# Patient Record
Sex: FEMALE | URBAN - METROPOLITAN AREA
[De-identification: names, ages, dates, MRNs, and addresses within clinical notes are randomized per-mention and may not be internally consistent; named-entity substitution may affect disease eponyms.]

---

## 2022-11-21 ENCOUNTER — TRANSCRIBE ORDER (OUTPATIENT)
Dept: SCHEDULING | Age: 67
End: 2022-11-21

## 2022-11-21 DIAGNOSIS — Z12.31 ENCOUNTER FOR SCREENING MAMMOGRAM FOR MALIGNANT NEOPLASM OF BREAST: Primary | ICD-10-CM

## 2022-11-22 ENCOUNTER — TRANSCRIBE ORDER (OUTPATIENT)
Dept: SCHEDULING | Age: 67
End: 2022-11-22

## 2022-11-22 DIAGNOSIS — Z12.31 VISIT FOR SCREENING MAMMOGRAM: Primary | ICD-10-CM

## 2023-01-03 ENCOUNTER — HOSPITAL ENCOUNTER (OUTPATIENT)
Dept: MAMMOGRAPHY | Age: 68
Discharge: HOME | End: 2023-01-03
Attending: OBSTETRICS & GYNECOLOGY
Payer: COMMERCIAL

## 2023-01-03 DIAGNOSIS — Z12.31 VISIT FOR SCREENING MAMMOGRAM: ICD-10-CM

## 2023-01-03 PROCEDURE — 77063 BREAST TOMOSYNTHESIS BI: CPT

## 2023-01-06 ENCOUNTER — OFFICE VISIT (OUTPATIENT)
Dept: FAMILY MEDICINE CLINIC | Age: 68
End: 2023-01-06
Payer: COMMERCIAL

## 2023-01-06 VITALS
TEMPERATURE: 97.9 F | BODY MASS INDEX: 23.35 KG/M2 | HEART RATE: 83 BPM | RESPIRATION RATE: 16 BRPM | OXYGEN SATURATION: 99 % | HEIGHT: 64 IN | SYSTOLIC BLOOD PRESSURE: 102 MMHG | WEIGHT: 136.8 LBS | DIASTOLIC BLOOD PRESSURE: 60 MMHG

## 2023-01-06 DIAGNOSIS — M85.852 OSTEOPENIA OF BOTH HIPS: Primary | ICD-10-CM

## 2023-01-06 DIAGNOSIS — R05.2 SUBACUTE COUGH: ICD-10-CM

## 2023-01-06 DIAGNOSIS — M85.851 OSTEOPENIA OF BOTH HIPS: Primary | ICD-10-CM

## 2023-01-06 PROCEDURE — 1123F ACP DISCUSS/DSCN MKR DOCD: CPT | Performed by: FAMILY MEDICINE

## 2023-01-06 PROCEDURE — 99203 OFFICE O/P NEW LOW 30 MIN: CPT | Performed by: FAMILY MEDICINE

## 2023-01-06 RX ORDER — ESTRADIOL 10 UG/1
INSERT VAGINAL
COMMUNITY

## 2023-01-06 RX ORDER — DESONIDE 0.5 MG/G
OINTMENT TOPICAL
COMMUNITY
Start: 2022-10-24

## 2023-01-06 NOTE — PROGRESS NOTES
Identified pt with two pt identifiers(name and ). Chief Complaint   Patient presents with    Cough     Would like to discuss with you about her cough         Health Maintenance Due   Topic    Hepatitis C Screening     Depression Screen     DTaP/Tdap/Td series (1 - Tdap)    Lipid Screen     Colorectal Cancer Screening Combo     Shingles Vaccine (1 of 2)    Bone Densitometry (Dexa) Screening     Pneumococcal 65+ years (1 - PCV)       Wt Readings from Last 3 Encounters:   23 136 lb 12.8 oz (62.1 kg)     Temp Readings from Last 3 Encounters:   23 97.9 °F (36.6 °C) (Temporal)     BP Readings from Last 3 Encounters:   23 102/60     Pulse Readings from Last 3 Encounters:   23 83         Learning Assessment:  :     No flowsheet data found. Depression Screening:  :     3 most recent PHQ Screens 2023   Little interest or pleasure in doing things Not at all   Feeling down, depressed, irritable, or hopeless Not at all   Total Score PHQ 2 0       Fall Risk Assessment:  :     Fall Risk Assessment, last 12 mths 2023   Able to walk? Yes   Fall in past 12 months? 0   Do you feel unsteady? 0   Are you worried about falling 0       Abuse Screening:  :     Abuse Screening Questionnaire 2023   Do you ever feel afraid of your partner? N   Are you in a relationship with someone who physically or mentally threatens you? N   Is it safe for you to go home? Y       Coordination of Care Questionnaire:  :     1) Have you been to an emergency room, urgent care clinic since your last visit? no   Hospitalized since your last visit? no             2) Have you seen or consulted any other health care providers outside of [address] [place] [address] since your last visit? no (Include any pap smears or colon screenings in this section.)    3) Do you have an Advance Directive on file?  no  Are you interested in receiving information about Advance Directives? no    Patient is accompanied by self I have received verbal consent from Dorina Montiel to discuss any/all medical information while they are present in the room. 4.  For patients aged 39-70: Has the patient had a colonoscopy / FIT/ Cologuard? Yes - no Care Gap present June 2022 Dr Blanca Mcintosh MD       If the patient is female:    11. For patients aged 41-77: Has the patient had a mammogram within the past 2 years? Yes - no Care Gap present      6. For patients aged 21-65: Has the patient had a pap smear?  Yes - no Care Gap present last month Dr. Orozco Graft

## 2023-01-10 NOTE — PROGRESS NOTES
Robert Carlos (: 1955) is a 79 y.o. female, new patient, here for evaluation of the following chief complaint(s):  Cough (Would like to discuss with you about her cough )       ASSESSMENT/PLAN:  Below is the assessment and plan developed based on review of pertinent history, physical exam, labs, studies, and medications. 1. Osteopenia of both hips  -     REFERRAL TO DERMATOLOGY  -     REFERRAL TO GASTROENTEROLOGY  2. Subacute cough      No follow-ups on file. SUBJECTIVE/OBJECTIVE:  Cough  Patient complains of nonproductive cough. Symptoms began several months ago. The cough is non-productive, without wheezing, dyspnea or hemoptysis and is aggravated by pollens and cold air Associated symptoms include:SOB. Patient does not have new pets. Patient does not have a history of asthma. Patient does not have a history of environmental allergens. Patient does not have recent travel. Patient does not have a history of smoking. Patient  has previous Chest X-ray. Patient has had a PPD done. Patient reports a full work up for cough- was worked up for interstitial lung disease? Listed non specific symptoms. Also states that cough has lingered after a viral illness      Review of Systems   Constitutional: Negative. HENT: Negative. Respiratory:  Negative for cough, chest tightness, shortness of breath and wheezing. All other systems reviewed and are negative. Physical Exam  Constitutional:       Appearance: Normal appearance. She is normal weight. HENT:      Head: Normocephalic and atraumatic. Right Ear: Tympanic membrane, ear canal and external ear normal.      Left Ear: Tympanic membrane, ear canal and external ear normal.      Nose: Nose normal.      Mouth/Throat:      Mouth: Mucous membranes are moist.      Pharynx: Oropharynx is clear. Eyes:      Extraocular Movements: Extraocular movements intact.       Conjunctiva/sclera: Conjunctivae normal.      Pupils: Pupils are equal, round, and reactive to light. Cardiovascular:      Rate and Rhythm: Normal rate and regular rhythm. Pulses: Normal pulses. Heart sounds: Normal heart sounds. Pulmonary:      Effort: Pulmonary effort is normal.      Breath sounds: Normal breath sounds. Abdominal:      General: Abdomen is flat. Bowel sounds are normal.   Musculoskeletal:         General: Normal range of motion. Cervical back: Normal range of motion and neck supple. Skin:     General: Skin is warm. Capillary Refill: Capillary refill takes less than 2 seconds. Neurological:      General: No focal deficit present. Mental Status: She is alert and oriented to person, place, and time. Mental status is at baseline. Psychiatric:         Mood and Affect: Mood normal.         Behavior: Behavior normal.         Thought Content: Thought content normal.         Judgment: Judgment normal.       An electronic signature was used to authenticate this note.   -- Saba Boland MD

## 2023-02-01 ENCOUNTER — OFFICE VISIT (OUTPATIENT)
Dept: FAMILY MEDICINE CLINIC | Age: 68
End: 2023-02-01
Payer: COMMERCIAL

## 2023-02-01 VITALS
DIASTOLIC BLOOD PRESSURE: 64 MMHG | WEIGHT: 139 LBS | SYSTOLIC BLOOD PRESSURE: 110 MMHG | RESPIRATION RATE: 17 BRPM | HEIGHT: 64 IN | OXYGEN SATURATION: 98 % | TEMPERATURE: 97.9 F | BODY MASS INDEX: 23.73 KG/M2 | HEART RATE: 82 BPM

## 2023-02-01 DIAGNOSIS — K90.0 CELIAC DISEASE: Primary | ICD-10-CM

## 2023-02-01 DIAGNOSIS — Z00.00 ENCOUNTER FOR SCREENING AND PREVENTATIVE CARE: ICD-10-CM

## 2023-02-01 DIAGNOSIS — R93.1 AGATSTON CORONARY ARTERY CALCIUM SCORE LESS THAN 100: ICD-10-CM

## 2023-02-01 PROCEDURE — 99213 OFFICE O/P EST LOW 20 MIN: CPT | Performed by: FAMILY MEDICINE

## 2023-02-01 PROCEDURE — 1123F ACP DISCUSS/DSCN MKR DOCD: CPT | Performed by: FAMILY MEDICINE

## 2023-02-01 NOTE — PROGRESS NOTES
Identified pt with two pt identifiers(name and ). Chief Complaint   Patient presents with    Physical     Patient is here for yearly physical     Labs     Patient needs to have lab work done         Health Maintenance Due   Topic    Hepatitis C Screening     DTaP/Tdap/Td series (1 - Tdap)    Lipid Screen     Colorectal Cancer Screening Combo     Shingles Vaccine (1 of 2)    Bone Densitometry (Dexa) Screening     Pneumococcal 65+ years (1 - PCV)       Wt Readings from Last 3 Encounters:   23 136 lb 12.8 oz (62.1 kg)     Temp Readings from Last 3 Encounters:   23 97.9 °F (36.6 °C) (Temporal)     BP Readings from Last 3 Encounters:   23 102/60     Pulse Readings from Last 3 Encounters:   23 83         Learning Assessment:  :     No flowsheet data found. Depression Screening:  :     3 most recent PHQ Screens 2023   Little interest or pleasure in doing things Not at all   Feeling down, depressed, irritable, or hopeless Not at all   Total Score PHQ 2 0       Fall Risk Assessment:  :     Fall Risk Assessment, last 12 mths 2023   Able to walk? Yes   Fall in past 12 months? 0   Do you feel unsteady? 0   Are you worried about falling 0       Abuse Screening:  :     Abuse Screening Questionnaire 2023   Do you ever feel afraid of your partner? N N   Are you in a relationship with someone who physically or mentally threatens you? N N   Is it safe for you to go home? Y Y       Coordination of Care Questionnaire:  :     1) Have you been to an emergency room, urgent care clinic since your last visit? no   Hospitalized since your last visit? no             2) Have you seen or consulted any other health care providers outside of [address] [place] [address] since your last visit? no  (Include any pap smears or colon screenings in this section.)    3) Do you have an Advance Directive on file?  no  Are you interested in receiving information about Advance Directives? no    Patient is accompanied by self I have received verbal consent from [facility] to discuss any/all medical information while they are present in the room. 4.  For patients aged 39-70: Has the patient had a colonoscopy / FIT/ Cologuard? No      If the patient is female:    5. For patients aged 41-77: Has the patient had a mammogram within the past 2 years? Yes - no Care Gap present      6. For patients aged 21-65: Has the patient had a pap smear?  NA - based on age or sex

## 2023-02-06 NOTE — PROGRESS NOTES
Remington Ospina (: 1955) is a 79 y.o. female, new patient, here for evaluation of the following chief complaint(s):  Physical (Patient is here for yearly physical ) and Labs (Patient needs to have lab work done )       ASSESSMENT/PLAN:  Below is the assessment and plan developed based on review of pertinent history, physical exam, labs, studies, and medications. 1. Celiac disease  -     CBC WITH AUTOMATED DIFF; Future  -     METABOLIC PANEL, COMPREHENSIVE; Future  -     HEMOGLOBIN A1C WITH EAG; Future  -     CELIAC DISEASE PROFILE (REFLEX TTG, IGG); Future  -     OCCULT BLOOD IMMUNOASSAY,DIAGNOSTIC; Future  2. Agatston coronary artery calcium score less than 100  -     TSH 3RD GENERATION; Future  -     T4 (THYROXINE); Future  -     LIPID PANEL; Future  3. Encounter for screening and preventative care  -     CBC WITH AUTOMATED DIFF; Future  -     METABOLIC PANEL, COMPREHENSIVE; Future      No follow-ups on file. SUBJECTIVE/OBJECTIVE:  Patient presents to discuss concerns with cardiovascular health. Has been healthy, has a strong family history of CAD and HTN. Review of Systems   Constitutional: Negative. HENT: Negative. Respiratory:  Negative for cough, chest tightness, shortness of breath and wheezing. All other systems reviewed and are negative. Physical Exam  Constitutional:       Appearance: Normal appearance. She is normal weight. HENT:      Head: Normocephalic and atraumatic. Right Ear: Tympanic membrane, ear canal and external ear normal.      Left Ear: Tympanic membrane, ear canal and external ear normal.      Nose: Nose normal.      Mouth/Throat:      Mouth: Mucous membranes are moist.      Pharynx: Oropharynx is clear. Eyes:      Extraocular Movements: Extraocular movements intact. Conjunctiva/sclera: Conjunctivae normal.      Pupils: Pupils are equal, round, and reactive to light. Cardiovascular:      Rate and Rhythm: Normal rate and regular rhythm. Pulses: Normal pulses. Heart sounds: Normal heart sounds. Pulmonary:      Effort: Pulmonary effort is normal.      Breath sounds: Normal breath sounds. Abdominal:      General: Abdomen is flat. Bowel sounds are normal.   Musculoskeletal:         General: Normal range of motion. Cervical back: Normal range of motion and neck supple. Skin:     General: Skin is warm. Capillary Refill: Capillary refill takes less than 2 seconds. Neurological:      General: No focal deficit present. Mental Status: She is alert and oriented to person, place, and time. Mental status is at baseline. Psychiatric:         Mood and Affect: Mood normal.         Behavior: Behavior normal.         Thought Content: Thought content normal.         Judgment: Judgment normal.       An electronic signature was used to authenticate this note.   -- Tonny Goldmann, MD

## 2023-02-07 ENCOUNTER — APPOINTMENT (OUTPATIENT)
Dept: FAMILY MEDICINE CLINIC | Age: 68
End: 2023-02-07

## 2023-02-07 DIAGNOSIS — R93.1 AGATSTON CORONARY ARTERY CALCIUM SCORE LESS THAN 100: ICD-10-CM

## 2023-02-07 DIAGNOSIS — Z00.00 ENCOUNTER FOR SCREENING AND PREVENTATIVE CARE: ICD-10-CM

## 2023-02-07 DIAGNOSIS — K90.0 CELIAC DISEASE: ICD-10-CM

## 2023-02-08 LAB
ALBUMIN SERPL-MCNC: 4.2 G/DL (ref 3.5–5)
ALBUMIN/GLOB SERPL: 1.6 (ref 1.1–2.2)
ALP SERPL-CCNC: 76 U/L (ref 45–117)
ALT SERPL-CCNC: 41 U/L (ref 12–78)
ANION GAP SERPL CALC-SCNC: 5 MMOL/L (ref 5–15)
AST SERPL-CCNC: 26 U/L (ref 15–37)
BASOPHILS # BLD: 0.1 K/UL (ref 0–0.1)
BASOPHILS NFR BLD: 1 % (ref 0–1)
BILIRUB SERPL-MCNC: 0.7 MG/DL (ref 0.2–1)
BUN SERPL-MCNC: 19 MG/DL (ref 6–20)
BUN/CREAT SERPL: 24 (ref 12–20)
CALCIUM SERPL-MCNC: 9.6 MG/DL (ref 8.5–10.1)
CHLORIDE SERPL-SCNC: 105 MMOL/L (ref 97–108)
CHOLEST SERPL-MCNC: 249 MG/DL
CO2 SERPL-SCNC: 29 MMOL/L (ref 21–32)
CREAT SERPL-MCNC: 0.79 MG/DL (ref 0.55–1.02)
DIFFERENTIAL METHOD BLD: ABNORMAL
EOSINOPHIL # BLD: 0.1 K/UL (ref 0–0.4)
EOSINOPHIL NFR BLD: 2 % (ref 0–7)
ERYTHROCYTE [DISTWIDTH] IN BLOOD BY AUTOMATED COUNT: 12.7 % (ref 11.5–14.5)
EST. AVERAGE GLUCOSE BLD GHB EST-MCNC: 105 MG/DL
GLOBULIN SER CALC-MCNC: 2.7 G/DL (ref 2–4)
GLUCOSE SERPL-MCNC: 92 MG/DL (ref 65–100)
HBA1C MFR BLD: 5.3 % (ref 4–5.6)
HCT VFR BLD AUTO: 42.7 % (ref 35–47)
HDLC SERPL-MCNC: 78 MG/DL
HDLC SERPL: 3.2 (ref 0–5)
HGB BLD-MCNC: 14.2 G/DL (ref 11.5–16)
IMM GRANULOCYTES # BLD AUTO: 0 K/UL (ref 0–0.04)
IMM GRANULOCYTES NFR BLD AUTO: 0 % (ref 0–0.5)
LDLC SERPL CALC-MCNC: 161 MG/DL (ref 0–100)
LYMPHOCYTES # BLD: 1.4 K/UL (ref 0.8–3.5)
LYMPHOCYTES NFR BLD: 33 % (ref 12–49)
MCH RBC QN AUTO: 33.4 PG (ref 26–34)
MCHC RBC AUTO-ENTMCNC: 33.3 G/DL (ref 30–36.5)
MCV RBC AUTO: 100.5 FL (ref 80–99)
MONOCYTES # BLD: 0.4 K/UL (ref 0–1)
MONOCYTES NFR BLD: 10 % (ref 5–13)
NEUTS SEG # BLD: 2.2 K/UL (ref 1.8–8)
NEUTS SEG NFR BLD: 54 % (ref 32–75)
NRBC # BLD: 0 K/UL (ref 0–0.01)
NRBC BLD-RTO: 0 PER 100 WBC
PLATELET # BLD AUTO: 184 K/UL (ref 150–400)
PMV BLD AUTO: 12.1 FL (ref 8.9–12.9)
POTASSIUM SERPL-SCNC: 4.6 MMOL/L (ref 3.5–5.1)
PROT SERPL-MCNC: 6.9 G/DL (ref 6.4–8.2)
RBC # BLD AUTO: 4.25 M/UL (ref 3.8–5.2)
SODIUM SERPL-SCNC: 139 MMOL/L (ref 136–145)
T4 SERPL-MCNC: 6.8 UG/DL (ref 4.8–13.9)
TRIGL SERPL-MCNC: 50 MG/DL (ref ?–150)
TSH SERPL DL<=0.05 MIU/L-ACNC: 1.07 UIU/ML (ref 0.36–3.74)
VLDLC SERPL CALC-MCNC: 10 MG/DL
WBC # BLD AUTO: 4.2 K/UL (ref 3.6–11)

## 2023-02-10 LAB
ENDOMYSIUM IGA SER QL: NEGATIVE
IGA SERPL-MCNC: 228 MG/DL (ref 87–352)
TTG IGA SER-ACNC: <2 U/ML (ref 0–3)

## 2023-04-22 DIAGNOSIS — Z12.31 ENCOUNTER FOR SCREENING MAMMOGRAM FOR MALIGNANT NEOPLASM OF BREAST: Primary | ICD-10-CM

## 2023-08-02 ENCOUNTER — TELEPHONE (OUTPATIENT)
Age: 68
End: 2023-08-02

## 2023-08-02 DIAGNOSIS — E78.00 HYPERCHOLESTEROLEMIA: Primary | ICD-10-CM

## 2023-08-02 NOTE — TELEPHONE ENCOUNTER
Pt called asking to make a lab appointment but there was no orders for labs. I told her this and she said that she was told for her appointment on Thursday 8/10 that you guys would be going over her lipid and cholesterol labs so she wanted to do those labs before that appointment. I told her there wasn't a lab order so I couldn't make a lab appointment and that I would send a message back.

## 2023-08-02 NOTE — TELEPHONE ENCOUNTER
----- Message from Maribell Plummer sent at 8/2/2023  1:28 PM EDT -----  Subject: Referral Request    Reason for referral request? Patient said she needs orders for lipids labs   completed for her upcoming appt on 08/10  Provider patient wants to be referred to(if known):     Provider Phone Number(if known):     Additional Information for Provider?   ---------------------------------------------------------------------------  --------------  Jacqueline Bristow Kwaku    [phone]; OK to leave message on voicemail  ---------------------------------------------------------------------------  --------------

## 2023-08-07 SDOH — ECONOMIC STABILITY: TRANSPORTATION INSECURITY
IN THE PAST 12 MONTHS, HAS LACK OF TRANSPORTATION KEPT YOU FROM MEETINGS, WORK, OR FROM GETTING THINGS NEEDED FOR DAILY LIVING?: NO

## 2023-08-07 SDOH — ECONOMIC STABILITY: FOOD INSECURITY: WITHIN THE PAST 12 MONTHS, THE FOOD YOU BOUGHT JUST DIDN'T LAST AND YOU DIDN'T HAVE MONEY TO GET MORE.: NEVER TRUE

## 2023-08-07 SDOH — ECONOMIC STABILITY: HOUSING INSECURITY
IN THE LAST 12 MONTHS, WAS THERE A TIME WHEN YOU DID NOT HAVE A STEADY PLACE TO SLEEP OR SLEPT IN A SHELTER (INCLUDING NOW)?: NO

## 2023-08-07 SDOH — ECONOMIC STABILITY: FOOD INSECURITY: WITHIN THE PAST 12 MONTHS, YOU WORRIED THAT YOUR FOOD WOULD RUN OUT BEFORE YOU GOT MONEY TO BUY MORE.: NEVER TRUE

## 2023-08-07 SDOH — ECONOMIC STABILITY: INCOME INSECURITY: HOW HARD IS IT FOR YOU TO PAY FOR THE VERY BASICS LIKE FOOD, HOUSING, MEDICAL CARE, AND HEATING?: NOT HARD AT ALL

## 2023-08-10 ENCOUNTER — OFFICE VISIT (OUTPATIENT)
Age: 68
End: 2023-08-10
Payer: COMMERCIAL

## 2023-08-10 VITALS
HEIGHT: 64 IN | HEART RATE: 69 BPM | RESPIRATION RATE: 16 BRPM | TEMPERATURE: 97.5 F | DIASTOLIC BLOOD PRESSURE: 62 MMHG | BODY MASS INDEX: 23.39 KG/M2 | SYSTOLIC BLOOD PRESSURE: 112 MMHG | WEIGHT: 137 LBS | OXYGEN SATURATION: 97 %

## 2023-08-10 DIAGNOSIS — E78.2 MODERATE MIXED HYPERLIPIDEMIA NOT REQUIRING STATIN THERAPY: Primary | ICD-10-CM

## 2023-08-10 PROCEDURE — G8420 CALC BMI NORM PARAMETERS: HCPCS | Performed by: FAMILY MEDICINE

## 2023-08-10 PROCEDURE — G8400 PT W/DXA NO RESULTS DOC: HCPCS | Performed by: FAMILY MEDICINE

## 2023-08-10 PROCEDURE — 1036F TOBACCO NON-USER: CPT | Performed by: FAMILY MEDICINE

## 2023-08-10 PROCEDURE — 1090F PRES/ABSN URINE INCON ASSESS: CPT | Performed by: FAMILY MEDICINE

## 2023-08-10 PROCEDURE — 3017F COLORECTAL CA SCREEN DOC REV: CPT | Performed by: FAMILY MEDICINE

## 2023-08-10 PROCEDURE — G8427 DOCREV CUR MEDS BY ELIG CLIN: HCPCS | Performed by: FAMILY MEDICINE

## 2023-08-10 PROCEDURE — 99213 OFFICE O/P EST LOW 20 MIN: CPT | Performed by: FAMILY MEDICINE

## 2023-08-10 PROCEDURE — 1123F ACP DISCUSS/DSCN MKR DOCD: CPT | Performed by: FAMILY MEDICINE

## 2023-08-10 ASSESSMENT — PATIENT HEALTH QUESTIONNAIRE - PHQ9
SUM OF ALL RESPONSES TO PHQ QUESTIONS 1-9: 0
SUM OF ALL RESPONSES TO PHQ9 QUESTIONS 1 & 2: 0
SUM OF ALL RESPONSES TO PHQ QUESTIONS 1-9: 0
SUM OF ALL RESPONSES TO PHQ QUESTIONS 1-9: 0
1. LITTLE INTEREST OR PLEASURE IN DOING THINGS: 0
SUM OF ALL RESPONSES TO PHQ QUESTIONS 1-9: 0
2. FEELING DOWN, DEPRESSED OR HOPELESS: 0

## 2023-08-11 ASSESSMENT — ENCOUNTER SYMPTOMS
EYES NEGATIVE: 1
ALLERGIC/IMMUNOLOGIC NEGATIVE: 1
VOMITING: 0
EYE DISCHARGE: 0
CONSTIPATION: 0
EYE PAIN: 0
NAUSEA: 0
DIARRHEA: 0
ABDOMINAL PAIN: 0
SINUS PRESSURE: 0
RHINORRHEA: 0
EYE REDNESS: 0
ANAL BLEEDING: 0
COLOR CHANGE: 0
RESPIRATORY NEGATIVE: 1
BLOOD IN STOOL: 0
GASTROINTESTINAL NEGATIVE: 1

## 2023-08-11 NOTE — PROGRESS NOTES
Carlos Jenkins (:  1955) is a 79 y.o. female,Established patient, here for evaluation of the following chief complaint(s):  Follow-up (6 month follow up)         ASSESSMENT/PLAN:  1. Moderate mixed hyperlipidemia not requiring statin therapy      No follow-ups on file. Subjective   SUBJECTIVE/OBJECTIVE:  Patient presents as a follow up for hyperlipidemia. Patient has been doing well without complaints, has started taking fiber supplements and has increased her total fiber intake. She is drinking more water and exercising without any complaints. She states that her blood pressure has been well controlled. She has followed up with her dermatologist, is up to date on all of her preventative medicine needs. Review of Systems   Constitutional: Negative. Negative for activity change, chills, fatigue and unexpected weight change. HENT: Negative. Negative for congestion, dental problem, nosebleeds, rhinorrhea and sinus pressure. Eyes: Negative. Negative for pain, discharge, redness and visual disturbance. Respiratory: Negative. Cardiovascular: Negative. Gastrointestinal: Negative. Negative for abdominal pain, anal bleeding, blood in stool, constipation, diarrhea, nausea and vomiting. Endocrine: Negative. Negative for cold intolerance, polydipsia and polyuria. Genitourinary: Negative. Negative for decreased urine volume, dyspareunia, dysuria, flank pain, frequency, hematuria, pelvic pain and urgency. Musculoskeletal:  Negative for arthralgias, gait problem, joint swelling and neck stiffness. Skin:  Negative for color change, pallor, rash and wound. Allergic/Immunologic: Negative. Negative for environmental allergies, food allergies and immunocompromised state. Neurological: Negative. Negative for dizziness, speech difficulty, weakness, light-headedness and headaches. Hematological: Negative. Psychiatric/Behavioral: Negative.   Negative for behavioral problems,

## 2023-08-15 ENCOUNTER — NURSE ONLY (OUTPATIENT)
Age: 68
End: 2023-08-15

## 2023-08-15 DIAGNOSIS — E78.00 HYPERCHOLESTEROLEMIA: ICD-10-CM

## 2023-08-16 LAB
CHOLEST SERPL-MCNC: 235 MG/DL
HDLC SERPL-MCNC: 68 MG/DL
HDLC SERPL: 3.5 (ref 0–5)
LDLC SERPL CALC-MCNC: 153.2 MG/DL (ref 0–100)
TRIGL SERPL-MCNC: 69 MG/DL
VLDLC SERPL CALC-MCNC: 13.8 MG/DL

## 2023-12-06 ENCOUNTER — TRANSCRIBE ORDERS (OUTPATIENT)
Facility: HOSPITAL | Age: 68
End: 2023-12-06

## 2023-12-06 DIAGNOSIS — Z12.31 VISIT FOR SCREENING MAMMOGRAM: Primary | ICD-10-CM

## 2024-01-04 ENCOUNTER — HOSPITAL ENCOUNTER (OUTPATIENT)
Facility: HOSPITAL | Age: 69
Discharge: HOME | End: 2024-01-04
Attending: OBSTETRICS & GYNECOLOGY
Payer: MEDICARE

## 2024-01-04 VITALS — BODY MASS INDEX: 22.36 KG/M2 | HEIGHT: 64 IN | WEIGHT: 131 LBS

## 2024-01-04 DIAGNOSIS — Z12.31 VISIT FOR SCREENING MAMMOGRAM: ICD-10-CM

## 2024-01-04 PROCEDURE — 77067 SCR MAMMO BI INCL CAD: CPT

## 2024-02-15 ENCOUNTER — OFFICE VISIT (OUTPATIENT)
Age: 69
End: 2024-02-15
Payer: MEDICARE

## 2024-02-15 VITALS
DIASTOLIC BLOOD PRESSURE: 67 MMHG | RESPIRATION RATE: 17 BRPM | OXYGEN SATURATION: 98 % | HEIGHT: 64 IN | WEIGHT: 128.4 LBS | HEART RATE: 80 BPM | SYSTOLIC BLOOD PRESSURE: 128 MMHG | TEMPERATURE: 97.6 F | BODY MASS INDEX: 21.92 KG/M2

## 2024-02-15 DIAGNOSIS — K90.0 CELIAC DISEASE: ICD-10-CM

## 2024-02-15 DIAGNOSIS — R79.9 ABNORMAL FINDING OF BLOOD CHEMISTRY, UNSPECIFIED: ICD-10-CM

## 2024-02-15 DIAGNOSIS — Z00.00 MEDICARE ANNUAL WELLNESS VISIT, SUBSEQUENT: Primary | ICD-10-CM

## 2024-02-15 DIAGNOSIS — M48.061 SPINAL STENOSIS OF LUMBAR REGION WITHOUT NEUROGENIC CLAUDICATION: ICD-10-CM

## 2024-02-15 DIAGNOSIS — Z13.820 SCREENING FOR OSTEOPOROSIS: ICD-10-CM

## 2024-02-15 DIAGNOSIS — Z09 ENCOUNTER FOR FOLLOW-UP EXAMINATION AFTER COMPLETED TREATMENT FOR CONDITIONS OTHER THAN MALIGNANT NEOPLASM: ICD-10-CM

## 2024-02-15 DIAGNOSIS — E78.00 PURE HYPERCHOLESTEROLEMIA: ICD-10-CM

## 2024-02-15 PROCEDURE — G0439 PPPS, SUBSEQ VISIT: HCPCS | Performed by: FAMILY MEDICINE

## 2024-02-15 PROCEDURE — G8484 FLU IMMUNIZE NO ADMIN: HCPCS | Performed by: FAMILY MEDICINE

## 2024-02-15 PROCEDURE — 1123F ACP DISCUSS/DSCN MKR DOCD: CPT | Performed by: FAMILY MEDICINE

## 2024-02-15 PROCEDURE — 3017F COLORECTAL CA SCREEN DOC REV: CPT | Performed by: FAMILY MEDICINE

## 2024-02-15 RX ORDER — CETIRIZINE HYDROCHLORIDE 10 MG/1
10 TABLET ORAL DAILY
COMMUNITY

## 2024-02-15 NOTE — PROGRESS NOTES
Identified pt with two pt identifiers(name and ).    Chief Complaint   Patient presents with    Medicare AWV        Health Maintenance Due   Topic    Hepatitis C screen     DTaP/Tdap/Td vaccine (1 - Tdap)    Shingles vaccine (1 of 2)    DEXA (modify frequency per FRAX score)     Respiratory Syncytial Virus (RSV) Pregnant or age 60 yrs+ (1 - 1-dose 60+ series)    Pneumococcal 65+ years Vaccine (1 - PCV)    Annual Wellness Visit (Medicare)        Wt Readings from Last 3 Encounters:   24 59.4 kg (131 lb)   08/10/23 62.1 kg (137 lb)   23 63 kg (139 lb)     Temp Readings from Last 3 Encounters:   08/10/23 97.5 °F (36.4 °C) (Temporal)     BP Readings from Last 3 Encounters:   08/10/23 112/62   23 110/64   23 102/60     Pulse Readings from Last 3 Encounters:   08/10/23 69   23 82   23 83           Depression Screening:  :         2024     2:00 PM 8/10/2023     2:37 PM 2023     3:22 PM 2023     7:46 AM 2023     2:00 PM   PHQ-9 Questionaire   Little interest or pleasure in doing things 0 0 0 0 0   Feeling down, depressed, or hopeless 0 0 0 0 0   PHQ-9 Total Score 0 0 0 0 0        Fall Risk Assessment:  :         2024     2:00 PM 8/10/2023     2:38 PM   Fall Risk   2 or more falls in past year? no no   Fall with injury in past year? no no        Abuse Screening:  :          No data to display                 Coordination of Care Questionnaire:  :     \"Have you been to the ER, urgent care clinic since your last visit?  Hospitalized since your last visit?\"    NO    “Have you seen or consulted any other health care providers outside of [facility] since your last visit?”    NO

## 2024-02-17 NOTE — PROGRESS NOTES
Medicare Annual Wellness Visit    Terri Corbett is here for Medicare AW    Assessment & Plan   Screening for osteoporosis  -     DEXA BONE DENSITY AXIAL SKELETON; Future  -     [facility] - Mark Puri MD, Orthopedic Surgery (back, neck, spine), [place]  -     [facility] - Carter Hartman MD, Physical Medicine & Rehab, [place]  -     Vitamin D 25 Hydroxy; Future  Spinal stenosis of lumbar region without neurogenic claudication  -     DEXA BONE DENSITY AXIAL SKELETON; Future  -     [facility] - Mark Puri MD, Orthopedic Surgery (back, neck, spine), [place]  -     Carter Blanco MD, Physical Medicine & Rehab, [place]  Encounter for follow-up examination after completed treatment for conditions other than malignant neoplasm  -     DEXA BONE DENSITY AXIAL SKELETON; Future  Celiac disease  -     Lipid Panel; Future  -     Vitamin D 25 Hydroxy; Future  Pure hypercholesterolemia  -     TSH + Free T4 Panel; Future  -     Comprehensive Metabolic Panel; Future  -     Hemoglobin A1C; Future  Abnormal finding of blood chemistry, unspecified  -     Lipid Panel; Future  -     Hemoglobin A1C; Future  Medicare annual wellness visit, subsequent    Recommendations for Preventive Services Due: see orders and patient instructions/AVS.  Recommended screening schedule for the next 5-10 years is provided to the patient in written form: see Patient Instructions/AVS.     No follow-ups on file.     Subjective   The following acute and/or chronic problems were also addressed today:    Patient complains of back pain, lower. States that it is the worse when she sits for prolonged periods or does any activity. She takes care of her grandchild. Is very physically active, but has had to stop running, continues to swim.     She takes the appropriate dose of calcium, vitamin D, and has started taking collagen.     Patient's complete Health Risk Assessment and screening values have been reviewed and are found in Flowsheets. The

## 2024-02-20 ENCOUNTER — NURSE ONLY (OUTPATIENT)
Age: 69
End: 2024-02-20

## 2024-02-20 DIAGNOSIS — E78.00 PURE HYPERCHOLESTEROLEMIA: ICD-10-CM

## 2024-02-20 DIAGNOSIS — Z13.820 SCREENING FOR OSTEOPOROSIS: ICD-10-CM

## 2024-02-20 DIAGNOSIS — R79.9 ABNORMAL FINDING OF BLOOD CHEMISTRY, UNSPECIFIED: ICD-10-CM

## 2024-02-20 DIAGNOSIS — K90.0 CELIAC DISEASE: ICD-10-CM

## 2024-02-20 LAB — 25(OH)D3 SERPL-MCNC: 48.8 NG/ML (ref 30–100)

## 2024-02-21 ENCOUNTER — TRANSCRIBE ORDERS (OUTPATIENT)
Facility: HOSPITAL | Age: 69
End: 2024-02-21

## 2024-02-21 DIAGNOSIS — Z13.6 SCREENING, HEART DISEASE, ISCHEMIC: Primary | ICD-10-CM

## 2024-02-21 LAB
ALBUMIN SERPL-MCNC: 3.9 G/DL (ref 3.5–5)
ALBUMIN/GLOB SERPL: 1.1 (ref 1.1–2.2)
ALP SERPL-CCNC: 97 U/L (ref 45–117)
ALT SERPL-CCNC: 31 U/L (ref 12–78)
ANION GAP SERPL CALC-SCNC: 1 MMOL/L (ref 5–15)
AST SERPL-CCNC: 26 U/L (ref 15–37)
BILIRUB SERPL-MCNC: 0.5 MG/DL (ref 0.2–1)
BUN SERPL-MCNC: 18 MG/DL (ref 6–20)
BUN/CREAT SERPL: 25 (ref 12–20)
CALCIUM SERPL-MCNC: 9.6 MG/DL (ref 8.5–10.1)
CHLORIDE SERPL-SCNC: 105 MMOL/L (ref 97–108)
CHOLEST SERPL-MCNC: 217 MG/DL
CO2 SERPL-SCNC: 31 MMOL/L (ref 21–32)
CREAT SERPL-MCNC: 0.72 MG/DL (ref 0.55–1.02)
EST. AVERAGE GLUCOSE BLD GHB EST-MCNC: 105 MG/DL
GLOBULIN SER CALC-MCNC: 3.4 G/DL (ref 2–4)
GLUCOSE SERPL-MCNC: 94 MG/DL (ref 65–100)
HBA1C MFR BLD: 5.3 % (ref 4–5.6)
HDLC SERPL-MCNC: 55 MG/DL
HDLC SERPL: 3.9 (ref 0–5)
LDLC SERPL CALC-MCNC: 151 MG/DL (ref 0–100)
POTASSIUM SERPL-SCNC: 4.3 MMOL/L (ref 3.5–5.1)
PROT SERPL-MCNC: 7.3 G/DL (ref 6.4–8.2)
SODIUM SERPL-SCNC: 137 MMOL/L (ref 136–145)
T4 FREE SERPL-MCNC: 0.9 NG/DL (ref 0.8–1.5)
TRIGL SERPL-MCNC: 55 MG/DL
TSH SERPL DL<=0.05 MIU/L-ACNC: 1.4 UIU/ML (ref 0.36–3.74)
VLDLC SERPL CALC-MCNC: 11 MG/DL

## 2024-02-28 ENCOUNTER — TELEPHONE (OUTPATIENT)
Age: 69
End: 2024-02-28

## 2024-02-28 NOTE — TELEPHONE ENCOUNTER
----- Message from Terri Corbett sent at 2/27/2024  9:48 PM EST -----  Regarding: Colonoscopy Request  Contact: [phone]  [facility]  Dr Sanders   [phone]

## 2024-03-03 ENCOUNTER — HOSPITAL ENCOUNTER (OUTPATIENT)
Facility: HOSPITAL | Age: 69
Discharge: HOME | End: 2024-03-06
Attending: FAMILY MEDICINE

## 2024-03-03 DIAGNOSIS — Z13.6 SCREENING, HEART DISEASE, ISCHEMIC: ICD-10-CM

## 2024-03-03 PROCEDURE — 75571 CT HRT W/O DYE W/CA TEST: CPT

## 2024-03-06 ENCOUNTER — HOSPITAL ENCOUNTER (OUTPATIENT)
Facility: HOSPITAL | Age: 69
Discharge: HOME | End: 2024-03-09
Attending: FAMILY MEDICINE
Payer: MEDICARE

## 2024-03-06 DIAGNOSIS — M48.061 SPINAL STENOSIS OF LUMBAR REGION WITHOUT NEUROGENIC CLAUDICATION: ICD-10-CM

## 2024-03-06 DIAGNOSIS — Z09 ENCOUNTER FOR FOLLOW-UP EXAMINATION AFTER COMPLETED TREATMENT FOR CONDITIONS OTHER THAN MALIGNANT NEOPLASM: ICD-10-CM

## 2024-03-06 DIAGNOSIS — Z13.820 SCREENING FOR OSTEOPOROSIS: ICD-10-CM

## 2024-03-06 PROCEDURE — 77080 DXA BONE DENSITY AXIAL: CPT

## 2024-03-13 ENCOUNTER — TELEPHONE (OUTPATIENT)
Age: 69
End: 2024-03-13

## 2024-03-13 DIAGNOSIS — M81.0 OSTEOPOROSIS, UNSPECIFIED OSTEOPOROSIS TYPE, UNSPECIFIED PATHOLOGICAL FRACTURE PRESENCE: ICD-10-CM

## 2024-03-13 DIAGNOSIS — E78.2 MODERATE MIXED HYPERLIPIDEMIA NOT REQUIRING STATIN THERAPY: Primary | ICD-10-CM

## 2024-03-13 RX ORDER — OMEGA-3-ACID ETHYL ESTERS 1 G/1
2 CAPSULE, LIQUID FILLED ORAL 2 TIMES DAILY
Qty: 60 CAPSULE | Refills: 3 | Status: SHIPPED | OUTPATIENT
Start: 2024-03-13

## 2024-03-13 NOTE — TELEPHONE ENCOUNTER
3/13/2024  11:27 AM    1. Moderate mixed hyperlipidemia not requiring statin therapy  Labs and coronary artery calcium score was discussed with the patient. All within very normal limits, we discussed that fish oil has to potential to maintain and possibly improve levels.   - omega-3 acid ethyl esters (LOVAZA) 1 g capsule; Take 2 capsules by mouth 2 times daily  Dispense: 60 capsule; Refill: 3    2. Osteoporosis, unspecified osteoporosis type, unspecified pathological fracture presence  Patient had tried fosamax in the past, but didn't tolerate it. Has a hx of celiac disease? Would like to discuss options.   - [facility] - Lakisha Quigley MD, Endocrinology, Carlos Rizvi MD   No future appointments.

## 2024-04-10 ENCOUNTER — TELEPHONE (OUTPATIENT)
Age: 69
End: 2024-04-10

## 2024-05-03 ENCOUNTER — HOSPITAL ENCOUNTER (OUTPATIENT)
Facility: HOSPITAL | Age: 69
End: 2024-05-03
Attending: STUDENT IN AN ORGANIZED HEALTH CARE EDUCATION/TRAINING PROGRAM
Payer: MEDICARE

## 2024-05-03 DIAGNOSIS — S22.060A CLOSED WEDGE COMPRESSION FRACTURE OF T8 VERTEBRA, INITIAL ENCOUNTER (HCC): ICD-10-CM

## 2024-05-03 PROCEDURE — 72146 MRI CHEST SPINE W/O DYE: CPT

## 2024-05-23 ENCOUNTER — OFFICE VISIT (OUTPATIENT)
Age: 69
End: 2024-05-23

## 2024-05-23 VITALS
BODY MASS INDEX: 22.48 KG/M2 | HEART RATE: 64 BPM | RESPIRATION RATE: 16 BRPM | HEIGHT: 64 IN | WEIGHT: 131.7 LBS | OXYGEN SATURATION: 100 % | DIASTOLIC BLOOD PRESSURE: 62 MMHG | SYSTOLIC BLOOD PRESSURE: 103 MMHG | TEMPERATURE: 98.3 F

## 2024-05-23 DIAGNOSIS — E55.9 VITAMIN D DEFICIENCY: ICD-10-CM

## 2024-05-23 DIAGNOSIS — M80.00XA OSTEOPOROSIS WITH PATHOLOGICAL FRACTURE, INITIAL ENCOUNTER: Primary | ICD-10-CM

## 2024-05-23 LAB — PHOSPHATE SERPL-MCNC: 3.1 MG/DL (ref 2.6–4.7)

## 2024-05-23 RX ORDER — ELECTROLYTES/DEXTROSE
1 SOLUTION, ORAL ORAL DAILY
COMMUNITY

## 2024-05-23 NOTE — PROGRESS NOTES
maintenance  Discussed different options, benefits and side effects -injectables including antiresorptive's (Reclast, Prolia), anabolics (Forteo, Tymlos, Evenity)  If starting Prolia cannot be discontinued instead needs transition to another agent due to risk of rebound bone loss    Daily requirement of calcium is 1000 mg - 1200 mg and Vitamin D is 1000 - 2000 Units daily (should  preferably be from food sources).Calcium rich food choices are  low fat dairy products, fortified orange juice,broccoli,salmon.  Fall precautions.Weight bearing exercises helps.  Excess alcohol and smoking weakens the bone and hence should be avoided.     Vitamin D deficiency: On supplements,    Compression fracture: S/p kyphoplasty              Thank you for allowing me to participate in the care of this patient.    Lakisha Quigley MD    Patient /caregiver verbalized understanding      No follow-up provider specified.    Voice-recognition software was used to generate this report, which may result in some phonetic-based errors in the grammar and contents.  Even though attempts were made to correct all the mistakes, some may have been missed and remained in the body of the report.

## 2024-05-23 NOTE — PATIENT INSTRUCTIONS
24 hour urine collection instructions    On the day you plan to collect urine    1. Discard first urine sample soon after you get up    2. Start collecting urine samples in the container then on whole first day .....    3. until the first sample next day is collected into the jar.     Benefits of osteoporosis medications  Medicines for osteoporosis help prevent bone loss and increase bone density, which decrease the fracturesi.The table below outlines the common osteoporosis drugs, and how much they reduce the risk of fractures.     Drug name How you take the medication Relative risk reduction for vertebral fracture  Relative risk reduction for hip fractures         Raloxifene (Evista)i Daily by mouth 36% No proven benefit    Alendronate  (Fosamax) Daily of weekly by mouth 45% 40%           Zolendronate (Reclast) Yearly IV infusion 70% 41%     Denosumabviiiviii  (Prolia) Twice yearly injection under the skin 68% 40%   Teriparatideixix (Forteo) Daily injection under the skin for 2 years  69% (this is in people with a prior vertebral fracture) No proven benefit   Abaloparatidexx (Tymlos) Daily injection under the skin for 1 and 1/2 years 86% No proven benefit   Romosozumab  ( Evenity) Monthly injection for 1 year  73%       Safety and side effects of osteoporosis medications    Common side effects of osteoporosis drugs  Acid reflux: this is only seen in oral osteoporosis drugs.   Flu-like symptoms: This is uncommon in oral osteoporosis drugs, but about 1 in 20 patients receiving the injectable osteoporosis drugs (reclast and prolia) can have flu-like symptoms for 1-3 days after the injection.   Low calcium (“hypocalcemia”): This is uncommon in oral osteoporosis drugs, but about 1-2 out of 100 patients receiving injectable osteoporosis drugs (reclast and prolia) can cause low calcium levels in the blood.     Rare side effects of osteoporosis drugs  Osteonecrosis of the jaw (ONJ):   0.01% over 10 years.In other words,

## 2024-05-29 DIAGNOSIS — M80.00XA OSTEOPOROSIS WITH PATHOLOGICAL FRACTURE, INITIAL ENCOUNTER: ICD-10-CM

## 2024-05-29 DIAGNOSIS — E55.9 VITAMIN D DEFICIENCY: ICD-10-CM

## 2024-05-30 LAB
ALBUMIN SERPL ELPH-MCNC: 3.8 G/DL (ref 2.9–4.4)
ALBUMIN/GLOB SERPL: 1.4 (ref 0.7–1.7)
ALPHA1 GLOB SERPL ELPH-MCNC: 0.2 G/DL (ref 0–0.4)
ALPHA2 GLOB SERPL ELPH-MCNC: 0.7 G/DL (ref 0.4–1)
B-GLOBULIN SERPL ELPH-MCNC: 1.1 G/DL (ref 0.7–1.3)
CALCIUM 24H UR-MRATE: 322 MG/24 HR (ref 142–353)
COLLECT DURATION TIME UR: 24 HR
COLLECT DURATION TIME UR: 24 HR
CREAT 24H UR-MRATE: 1448 MG/24HR (ref 600–1800)
GAMMA GLOB SERPL ELPH-MCNC: 0.8 G/DL (ref 0.4–1.8)
GLOBULIN SER CALC-MCNC: 2.7 G/DL (ref 2.2–3.9)
M PROTEIN SERPL ELPH-MCNC: NORMAL G/DL
PROT SERPL-MCNC: 6.5 G/DL (ref 6–8.5)
SPECIMEN VOL 24H UR: 2600 ML
SPECIMEN VOL ?TM UR: 2600 ML

## 2024-06-10 ENCOUNTER — TELEPHONE (OUTPATIENT)
Age: 69
End: 2024-06-10

## 2024-07-03 ENCOUNTER — TRANSCRIBE ORDERS (OUTPATIENT)
Facility: HOSPITAL | Age: 69
End: 2024-07-03

## 2024-07-03 DIAGNOSIS — M75.121 NONTRAUMATIC COMPLETE TEAR OF RIGHT ROTATOR CUFF: Primary | ICD-10-CM

## 2024-07-10 ENCOUNTER — HOSPITAL ENCOUNTER (OUTPATIENT)
Facility: HOSPITAL | Age: 69
Discharge: HOME | End: 2024-07-13
Attending: ORTHOPAEDIC SURGERY
Payer: MEDICARE

## 2024-07-10 DIAGNOSIS — M75.121 NONTRAUMATIC COMPLETE TEAR OF RIGHT ROTATOR CUFF: ICD-10-CM

## 2024-07-10 PROCEDURE — 73221 MRI JOINT UPR EXTREM W/O DYE: CPT

## 2024-07-29 DIAGNOSIS — M81.0 OSTEOPOROSIS WITHOUT CURRENT PATHOLOGICAL FRACTURE, UNSPECIFIED OSTEOPOROSIS TYPE: Primary | ICD-10-CM

## 2024-07-29 RX ORDER — ALBUTEROL SULFATE 90 UG/1
4 AEROSOL, METERED RESPIRATORY (INHALATION) PRN
Status: CANCELLED | OUTPATIENT
Start: 2024-08-05

## 2024-07-29 RX ORDER — DIPHENHYDRAMINE HYDROCHLORIDE 50 MG/ML
50 INJECTION INTRAMUSCULAR; INTRAVENOUS
Status: CANCELLED | OUTPATIENT
Start: 2024-08-05

## 2024-07-29 RX ORDER — ONDANSETRON 2 MG/ML
8 INJECTION INTRAMUSCULAR; INTRAVENOUS
Status: CANCELLED | OUTPATIENT
Start: 2024-08-05

## 2024-07-29 RX ORDER — SODIUM CHLORIDE 9 MG/ML
INJECTION, SOLUTION INTRAVENOUS CONTINUOUS
Status: CANCELLED | OUTPATIENT
Start: 2024-08-05

## 2024-07-29 RX ORDER — ACETAMINOPHEN 325 MG/1
650 TABLET ORAL
Status: CANCELLED | OUTPATIENT
Start: 2024-08-05

## 2024-07-29 RX ORDER — EPINEPHRINE 1 MG/ML
0.3 INJECTION, SOLUTION, CONCENTRATE INTRAVENOUS PRN
Status: CANCELLED | OUTPATIENT
Start: 2024-08-05

## 2024-07-29 RX ORDER — FAMOTIDINE 10 MG/ML
20 INJECTION, SOLUTION INTRAVENOUS
Status: CANCELLED | OUTPATIENT
Start: 2024-08-05

## 2024-07-30 ENCOUNTER — TELEPHONE (OUTPATIENT)
Age: 69
End: 2024-07-30

## 2024-07-30 DIAGNOSIS — S22.060D: ICD-10-CM

## 2024-07-30 DIAGNOSIS — Z98.890 STATUS POST KYPHOPLASTY: ICD-10-CM

## 2024-07-30 DIAGNOSIS — S46.001D INJURY OF RIGHT ROTATOR CUFF, SUBSEQUENT ENCOUNTER: ICD-10-CM

## 2024-07-30 DIAGNOSIS — M80.80XD LOCALIZED OSTEOPOROSIS WITH CURRENT PATHOLOGICAL FRACTURE WITH ROUTINE HEALING, SUBSEQUENT ENCOUNTER: Primary | ICD-10-CM

## 2024-07-30 PROBLEM — S22.060A: Status: ACTIVE | Noted: 2024-07-30

## 2024-07-30 PROBLEM — S46.001A INJURY OF RIGHT ROTATOR CUFF: Status: ACTIVE | Noted: 2024-07-30

## 2024-07-30 NOTE — TELEPHONE ENCOUNTER
1. Localized osteoporosis with current pathological fracture with routine healing, subsequent encounter  2. Traumatic compression fracture of T7 thoracic vertebra with routine healing, subsequent encounter  3. Status post kyphoplasty  4. Injury of right rotator cuff, subsequent encounter      Patient suffered a compression fracture, is on the schedule for infusion for Evenity.

## 2024-07-31 DIAGNOSIS — E78.2 MODERATE MIXED HYPERLIPIDEMIA NOT REQUIRING STATIN THERAPY: ICD-10-CM

## 2024-07-31 RX ORDER — OMEGA-3-ACID ETHYL ESTERS 1 G/1
CAPSULE, LIQUID FILLED ORAL
Qty: 60 CAPSULE | Refills: 3 | Status: SHIPPED | OUTPATIENT
Start: 2024-07-31

## 2024-08-05 ENCOUNTER — HOSPITAL ENCOUNTER (OUTPATIENT)
Facility: HOSPITAL | Age: 69
Setting detail: INFUSION SERIES
Discharge: HOME | End: 2024-08-05
Payer: MEDICARE

## 2024-08-05 ENCOUNTER — TELEPHONE (OUTPATIENT)
Age: 69
End: 2024-08-05

## 2024-08-05 VITALS
OXYGEN SATURATION: 96 % | HEIGHT: 64 IN | HEART RATE: 70 BPM | DIASTOLIC BLOOD PRESSURE: 80 MMHG | RESPIRATION RATE: 18 BRPM | TEMPERATURE: 97.9 F | BODY MASS INDEX: 23.56 KG/M2 | WEIGHT: 138 LBS | SYSTOLIC BLOOD PRESSURE: 125 MMHG

## 2024-08-05 DIAGNOSIS — M81.0 OSTEOPOROSIS WITHOUT CURRENT PATHOLOGICAL FRACTURE, UNSPECIFIED OSTEOPOROSIS TYPE: Primary | ICD-10-CM

## 2024-08-05 LAB
ANION GAP BLD CALC-SCNC: 10.6 MMOL/L (ref 10–20)
CA-I BLD-MCNC: 1.28 MMOL/L (ref 1.12–1.32)
CHLORIDE BLD-SCNC: 105 MMOL/L (ref 98–107)
CO2 BLD-SCNC: 24.4 MMOL/L (ref 21–32)
CREAT BLD-MCNC: 0.55 MG/DL (ref 0.6–1.3)
GLUCOSE BLD-MCNC: 93 MG/DL (ref 70–110)
POTASSIUM BLD-SCNC: 3.6 MMOL/L (ref 3.5–5.1)
SERVICE CMNT-IMP: ABNORMAL
SODIUM BLD-SCNC: 140 MMOL/L (ref 136–145)

## 2024-08-05 PROCEDURE — 96372 THER/PROPH/DIAG INJ SC/IM: CPT

## 2024-08-05 PROCEDURE — 6360000002 HC RX W HCPCS: Performed by: INTERNAL MEDICINE

## 2024-08-05 PROCEDURE — 80047 BASIC METABLC PNL IONIZED CA: CPT

## 2024-08-05 RX ORDER — EPINEPHRINE 1 MG/ML
0.3 INJECTION, SOLUTION INTRAMUSCULAR; SUBCUTANEOUS PRN
OUTPATIENT
Start: 2024-09-02

## 2024-08-05 RX ORDER — SODIUM CHLORIDE 9 MG/ML
INJECTION, SOLUTION INTRAVENOUS CONTINUOUS
OUTPATIENT
Start: 2024-09-02

## 2024-08-05 RX ORDER — DIPHENHYDRAMINE HYDROCHLORIDE 50 MG/ML
50 INJECTION INTRAMUSCULAR; INTRAVENOUS
OUTPATIENT
Start: 2024-09-02

## 2024-08-05 RX ORDER — FAMOTIDINE 10 MG/ML
20 INJECTION, SOLUTION INTRAVENOUS
OUTPATIENT
Start: 2024-09-02

## 2024-08-05 RX ORDER — ONDANSETRON 2 MG/ML
8 INJECTION INTRAMUSCULAR; INTRAVENOUS
OUTPATIENT
Start: 2024-09-02

## 2024-08-05 RX ORDER — ACETAMINOPHEN 325 MG/1
650 TABLET ORAL
OUTPATIENT
Start: 2024-09-02

## 2024-08-05 RX ORDER — ALBUTEROL SULFATE 90 UG/1
4 AEROSOL, METERED RESPIRATORY (INHALATION) PRN
OUTPATIENT
Start: 2024-09-02

## 2024-08-05 RX ADMIN — ROMOSOZUMAB-AQQG 105 MG: 105 INJECTION, SOLUTION SUBCUTANEOUS at 16:21

## 2024-08-05 ASSESSMENT — PAIN SCALES - GENERAL: PAINLEVEL_OUTOF10: 0

## 2024-08-05 NOTE — TELEPHONE ENCOUNTER
Evenity today at 3.  She wants to possibly to switch to Prolia.  She read up on Evenity last night and feels like Prolia has less side effects.

## 2024-08-05 NOTE — PROGRESS NOTES
[facility]    Date: August 5, 2024        1500: Ms. Corbett arrived ambulatory and in no distress for Evenity Injection. Assessment was completed, no acute issues or new complaints at this time. Peripheral labs drawn and sent for processing. Pt denies having any recent invasive dental work or any history of stroke.    Ms. Corbett's vitals were reviewed.  Patient Vitals for the past 12 hrs:   Temp Pulse Resp BP SpO2   08/05/24 1520 97.9 °F (36.6 °C) 70 18 125/80 96 %         Lab results were reviewed, criteria for treatment met.  Results for orders placed or performed during the hospital encounter of 08/05/24   POC CHEM 8   Result Value Ref Range    POC Ionized Calcium 1.28 1.12 - 1.32 mmol/L    POC Sodium 140 136 - 145 mmol/L    POC Potassium 3.6 3.5 - 5.1 mmol/L    POC Chloride 105 98 - 107 mmol/L    POC TCO2 24.4 21 - 32 mmol/L    Anion Gap, POC 10.6 10 - 20 mmol/L    POC Glucose 93 70 - 110 mg/dL    POC Creatinine 0.55 (L) 0.6 - 1.3 mg/dL    eGFR, POC >90 >60 ml/min/1.73m2    UA Comment Comment Not Indicated.         Medications given.  Medications Administered         romosozumab-aqqg (EVENITY) injection 105 mg Admin Date  08/05/2024 Action  Given Dose  105 mg Route  SubCUTAneous Administered By  Jyoti Sylvester, RN        romosozumab-aqqg (EVENITY) injection 105 mg Admin Date  08/05/2024 Action  Given Dose  105 mg Route  SubCUTAneous Administered By  Jyoti Sylvester, RN              1625: Patient tolerated treatment well and was discharged from [facility] in stable condition. Patient is aware of next scheduled [facility] appointment on 09/06/24.      Jyoti Sylvester RN  August 5, 2024

## 2024-09-05 ENCOUNTER — APPOINTMENT (OUTPATIENT)
Facility: HOSPITAL | Age: 69
End: 2024-09-05
Payer: MEDICARE

## 2024-09-06 ENCOUNTER — HOSPITAL ENCOUNTER (OUTPATIENT)
Facility: HOSPITAL | Age: 69
Setting detail: INFUSION SERIES
End: 2024-09-06
Payer: MEDICARE

## 2024-09-09 ENCOUNTER — HOSPITAL ENCOUNTER (OUTPATIENT)
Facility: HOSPITAL | Age: 69
Setting detail: INFUSION SERIES
Discharge: HOME | End: 2024-09-09
Payer: MEDICARE

## 2024-09-09 VITALS
SYSTOLIC BLOOD PRESSURE: 116 MMHG | WEIGHT: 133.1 LBS | DIASTOLIC BLOOD PRESSURE: 66 MMHG | OXYGEN SATURATION: 100 % | HEIGHT: 64 IN | HEART RATE: 87 BPM | RESPIRATION RATE: 12 BRPM | TEMPERATURE: 96.8 F | BODY MASS INDEX: 22.72 KG/M2

## 2024-09-09 DIAGNOSIS — M81.0 OSTEOPOROSIS WITHOUT CURRENT PATHOLOGICAL FRACTURE, UNSPECIFIED OSTEOPOROSIS TYPE: Primary | ICD-10-CM

## 2024-09-09 LAB
ANION GAP BLD CALC-SCNC: 10.1 MMOL/L (ref 10–20)
CA-I BLD-MCNC: 1.23 MMOL/L (ref 1.15–1.33)
CHLORIDE BLD-SCNC: 103 MMOL/L (ref 98–107)
CO2 BLD-SCNC: 26.9 MMOL/L (ref 21–32)
CREAT BLD-MCNC: 0.73 MG/DL (ref 0.6–1.3)
GLUCOSE BLD-MCNC: 79 MG/DL (ref 74–99)
POTASSIUM BLD-SCNC: 3.7 MMOL/L (ref 3.5–5.1)
SERVICE CMNT-IMP: NORMAL
SODIUM BLD-SCNC: 140 MMOL/L (ref 136–145)

## 2024-09-09 PROCEDURE — 6360000002 HC RX W HCPCS: Performed by: INTERNAL MEDICINE

## 2024-09-09 PROCEDURE — 80047 BASIC METABLC PNL IONIZED CA: CPT

## 2024-09-09 PROCEDURE — 96372 THER/PROPH/DIAG INJ SC/IM: CPT

## 2024-09-09 RX ORDER — ONDANSETRON 2 MG/ML
8 INJECTION INTRAMUSCULAR; INTRAVENOUS
OUTPATIENT
Start: 2024-09-29

## 2024-09-09 RX ORDER — ACETAMINOPHEN 325 MG/1
650 TABLET ORAL
OUTPATIENT
Start: 2024-09-29

## 2024-09-09 RX ORDER — FAMOTIDINE 10 MG/ML
20 INJECTION, SOLUTION INTRAVENOUS
OUTPATIENT
Start: 2024-09-29

## 2024-09-09 RX ORDER — SODIUM CHLORIDE 9 MG/ML
INJECTION, SOLUTION INTRAVENOUS CONTINUOUS
OUTPATIENT
Start: 2024-09-29

## 2024-09-09 RX ORDER — ALBUTEROL SULFATE 90 UG/1
4 INHALANT RESPIRATORY (INHALATION) PRN
OUTPATIENT
Start: 2024-09-29

## 2024-09-09 RX ORDER — EPINEPHRINE 1 MG/ML
0.3 INJECTION, SOLUTION INTRAMUSCULAR; SUBCUTANEOUS PRN
OUTPATIENT
Start: 2024-09-29

## 2024-09-09 RX ORDER — DIPHENHYDRAMINE HYDROCHLORIDE 50 MG/ML
50 INJECTION INTRAMUSCULAR; INTRAVENOUS
OUTPATIENT
Start: 2024-09-29

## 2024-09-09 RX ADMIN — ROMOSOZUMAB-AQQG 105 MG: 105 INJECTION, SOLUTION SUBCUTANEOUS at 16:34

## 2024-09-09 RX ADMIN — ROMOSOZUMAB-AQQG 105 MG: 105 INJECTION, SOLUTION SUBCUTANEOUS at 16:35

## 2024-09-09 ASSESSMENT — PAIN SCALES - GENERAL: PAINLEVEL_OUTOF10: 0

## 2024-10-04 ENCOUNTER — HOSPITAL ENCOUNTER (OUTPATIENT)
Facility: HOSPITAL | Age: 69
Setting detail: INFUSION SERIES
Discharge: HOME | End: 2024-10-04
Payer: MEDICARE

## 2024-10-04 VITALS
RESPIRATION RATE: 14 BRPM | TEMPERATURE: 97.6 F | BODY MASS INDEX: 22.91 KG/M2 | HEIGHT: 64 IN | DIASTOLIC BLOOD PRESSURE: 76 MMHG | WEIGHT: 134.2 LBS | SYSTOLIC BLOOD PRESSURE: 114 MMHG | HEART RATE: 75 BPM | OXYGEN SATURATION: 96 %

## 2024-10-04 DIAGNOSIS — M81.0 OSTEOPOROSIS WITHOUT CURRENT PATHOLOGICAL FRACTURE, UNSPECIFIED OSTEOPOROSIS TYPE: Primary | ICD-10-CM

## 2024-10-04 LAB
ANION GAP BLD CALC-SCNC: 6.9 MMOL/L (ref 10–20)
CA-I BLD-MCNC: 1.26 MMOL/L (ref 1.15–1.33)
CHLORIDE BLD-SCNC: 105 MMOL/L (ref 98–107)
CO2 BLD-SCNC: 27.1 MMOL/L (ref 21–32)
CREAT BLD-MCNC: 0.57 MG/DL (ref 0.6–1.3)
GLUCOSE BLD-MCNC: 115 MG/DL (ref 74–99)
POTASSIUM BLD-SCNC: 3.7 MMOL/L (ref 3.5–5.1)
SERVICE CMNT-IMP: ABNORMAL
SODIUM BLD-SCNC: 139 MMOL/L (ref 136–145)

## 2024-10-04 PROCEDURE — 80047 BASIC METABLC PNL IONIZED CA: CPT

## 2024-10-04 PROCEDURE — 6360000002 HC RX W HCPCS: Performed by: INTERNAL MEDICINE

## 2024-10-04 PROCEDURE — 96372 THER/PROPH/DIAG INJ SC/IM: CPT

## 2024-10-04 RX ORDER — ACETAMINOPHEN 325 MG/1
650 TABLET ORAL
OUTPATIENT
Start: 2024-10-07

## 2024-10-04 RX ORDER — FAMOTIDINE 10 MG/ML
20 INJECTION, SOLUTION INTRAVENOUS
OUTPATIENT
Start: 2024-10-07

## 2024-10-04 RX ORDER — SODIUM CHLORIDE 9 MG/ML
INJECTION, SOLUTION INTRAVENOUS CONTINUOUS
OUTPATIENT
Start: 2024-10-07

## 2024-10-04 RX ORDER — DIPHENHYDRAMINE HYDROCHLORIDE 50 MG/ML
50 INJECTION INTRAMUSCULAR; INTRAVENOUS
OUTPATIENT
Start: 2024-10-07

## 2024-10-04 RX ORDER — ONDANSETRON 2 MG/ML
8 INJECTION INTRAMUSCULAR; INTRAVENOUS
OUTPATIENT
Start: 2024-10-07

## 2024-10-04 RX ORDER — ALBUTEROL SULFATE 90 UG/1
4 INHALANT RESPIRATORY (INHALATION) PRN
OUTPATIENT
Start: 2024-10-07

## 2024-10-04 RX ORDER — EPINEPHRINE 1 MG/ML
0.3 INJECTION, SOLUTION INTRAMUSCULAR; SUBCUTANEOUS PRN
OUTPATIENT
Start: 2024-10-07

## 2024-10-04 RX ADMIN — ROMOSOZUMAB-AQQG 105 MG: 105 INJECTION, SOLUTION SUBCUTANEOUS at 14:55

## 2024-10-04 ASSESSMENT — PAIN DESCRIPTION - ORIENTATION: ORIENTATION: POSTERIOR

## 2024-10-04 ASSESSMENT — PAIN DESCRIPTION - LOCATION: LOCATION: BACK

## 2024-10-04 ASSESSMENT — PAIN DESCRIPTION - PAIN TYPE: TYPE: CHRONIC PAIN

## 2024-10-04 ASSESSMENT — PAIN SCALES - GENERAL: PAINLEVEL_OUTOF10: 3

## 2024-10-04 ASSESSMENT — PAIN DESCRIPTION - DESCRIPTORS: DESCRIPTORS: ACHING

## 2024-10-04 NOTE — PROGRESS NOTES
[facility] Progress Note    Date: October 4, 2024        1400: Pt arrived ambulatory to [facility] for Evenity injections in stable condition.  Assessment completed.  Labs drawn in Left AC and sent for processing. Labs reviewed. Criteria for treatment was met. Patient stated she has not had any invasive dental procedures recently.    Patient Vitals for the past 12 hrs:   Temp Pulse Resp BP SpO2   10/04/24 1411 97.6 °F (36.4 °C) 75 14 114/76 96 %         Medications Administered         romosozumab-aqqg (EVENITY) injection 105 mg Admin Date  10/04/2024 Action  Given Dose  105 mg Route  SubCUTAneous Documented By  Yolande Serrano, RN        romosozumab-aqqg (EVENITY) injection 105 mg Admin Date  10/04/2024 Action  Given Dose  105 mg Route  SubCUTAneous Documented By  Yolande Serrano, RN               Ms. Corbett tolerated the injections, and had no complaints.    Ms. Corbett was [facility] in [facility] condition. Patient is aware of next scheduled [facility] appointment 11/5/24 at 11:30 AM.         Future Appointments   Date Time Provider Department Center   11/4/2024  1:45 PM SPEC CDE LAB CDE BS [facility]   11/5/2024 11:30 AM [facility]   11/11/2024  3:00 PM Lakisha Quigley MD CDE BS [facility]   12/6/2024 10:00 AM [facility]   1/7/2025  2:00 PM [facility]   2/6/2025 10:30 AM [facility]   3/7/2025 10:30 AM SS FASTTRACK 3 [facility]         Yolande Serrano, OLU, RN  October 4, 2024

## 2024-10-31 ENCOUNTER — TELEPHONE (OUTPATIENT)
Age: 69
End: 2024-10-31

## 2024-10-31 DIAGNOSIS — E55.9 VITAMIN D DEFICIENCY: Primary | ICD-10-CM

## 2024-10-31 NOTE — TELEPHONE ENCOUNTER
Patient called stating that she needs to know whether she needs labs on Monday's lab appt. No orders in system.

## 2024-11-04 ENCOUNTER — LAB (OUTPATIENT)
Age: 69
End: 2024-11-04

## 2024-11-04 DIAGNOSIS — E55.9 VITAMIN D DEFICIENCY: ICD-10-CM

## 2024-11-05 ENCOUNTER — HOSPITAL ENCOUNTER (OUTPATIENT)
Facility: HOSPITAL | Age: 69
Setting detail: INFUSION SERIES
Discharge: HOME | End: 2024-11-05
Payer: MEDICARE

## 2024-11-05 VITALS
RESPIRATION RATE: 16 BRPM | DIASTOLIC BLOOD PRESSURE: 67 MMHG | HEIGHT: 64 IN | OXYGEN SATURATION: 97 % | BODY MASS INDEX: 22.91 KG/M2 | SYSTOLIC BLOOD PRESSURE: 117 MMHG | TEMPERATURE: 97.7 F | HEART RATE: 77 BPM | WEIGHT: 134.2 LBS

## 2024-11-05 DIAGNOSIS — M81.0 OSTEOPOROSIS WITHOUT CURRENT PATHOLOGICAL FRACTURE, UNSPECIFIED OSTEOPOROSIS TYPE: Primary | ICD-10-CM

## 2024-11-05 LAB
25(OH)D3 SERPL-MCNC: 43.5 NG/ML (ref 30–100)
ANION GAP BLD CALC-SCNC: 8.9 MMOL/L (ref 10–20)
CA-I BLD-MCNC: 1.27 MMOL/L (ref 1.15–1.33)
CHLORIDE BLD-SCNC: 104 MMOL/L (ref 98–107)
CO2 BLD-SCNC: 28.1 MMOL/L (ref 21–32)
CREAT BLD-MCNC: 0.64 MG/DL (ref 0.6–1.3)
GLUCOSE BLD-MCNC: 91 MG/DL (ref 74–99)
POTASSIUM BLD-SCNC: 3.7 MMOL/L (ref 3.5–5.1)
SERVICE CMNT-IMP: ABNORMAL
SODIUM BLD-SCNC: 141 MMOL/L (ref 136–145)

## 2024-11-05 PROCEDURE — 6360000002 HC RX W HCPCS: Performed by: INTERNAL MEDICINE

## 2024-11-05 PROCEDURE — 80047 BASIC METABLC PNL IONIZED CA: CPT

## 2024-11-05 PROCEDURE — 96372 THER/PROPH/DIAG INJ SC/IM: CPT

## 2024-11-05 RX ORDER — ACETAMINOPHEN 325 MG/1
650 TABLET ORAL
OUTPATIENT
Start: 2024-11-26

## 2024-11-05 RX ORDER — EPINEPHRINE 1 MG/ML
0.3 INJECTION, SOLUTION INTRAMUSCULAR; SUBCUTANEOUS PRN
OUTPATIENT
Start: 2024-11-26

## 2024-11-05 RX ORDER — FAMOTIDINE 10 MG/ML
20 INJECTION, SOLUTION INTRAVENOUS
OUTPATIENT
Start: 2024-11-26

## 2024-11-05 RX ORDER — ONDANSETRON 2 MG/ML
8 INJECTION INTRAMUSCULAR; INTRAVENOUS
OUTPATIENT
Start: 2024-11-26

## 2024-11-05 RX ORDER — SODIUM CHLORIDE 9 MG/ML
INJECTION, SOLUTION INTRAVENOUS CONTINUOUS
OUTPATIENT
Start: 2024-11-26

## 2024-11-05 RX ORDER — DIPHENHYDRAMINE HYDROCHLORIDE 50 MG/ML
50 INJECTION INTRAMUSCULAR; INTRAVENOUS
OUTPATIENT
Start: 2024-11-26

## 2024-11-05 RX ORDER — ALBUTEROL SULFATE 90 UG/1
4 INHALANT RESPIRATORY (INHALATION) PRN
OUTPATIENT
Start: 2024-11-26

## 2024-11-05 RX ADMIN — ROMOSOZUMAB-AQQG 105 MG: 105 INJECTION, SOLUTION SUBCUTANEOUS at 12:30

## 2024-11-05 RX ADMIN — ROMOSOZUMAB-AQQG 105 MG: 105 INJECTION, SOLUTION SUBCUTANEOUS at 12:28

## 2024-11-05 ASSESSMENT — PAIN SCALES - GENERAL: PAINLEVEL_OUTOF10: 0

## 2024-11-05 ASSESSMENT — PAIN DESCRIPTION - LOCATION: LOCATION: SHOULDER

## 2024-11-05 ASSESSMENT — PAIN DESCRIPTION - ORIENTATION: ORIENTATION: RIGHT

## 2024-11-05 NOTE — PROGRESS NOTES
[facility] Progress Note        Date: 2024    Name: Terri Corbett    MRN: [id number]         : 1955    Ms. Corbett Arrived ambulatory and in no distress for Evernity injection Regimen.  Assessment was completed, no acute issues at this time, no new complaints voiced.   Lab drawn peripherally from [facility]  and sent for processing. Pt denied having recent invasive dental procedure. Criteria are met for today treatment.      Ms. Corbett's vitals were reviewed.  Patient Vitals for the past 12 hrs:   Temp Pulse Resp BP SpO2   24 1129 97.7 °F (36.5 °C) 77 16 117/67 97 %         Lab results were obtained and reviewed.  Recent Results (from the past 12 hour(s))   POC CHEM 8    Collection Time: 24 11:45 AM   Result Value Ref Range    POC Ionized Calcium 1.27 1.15 - 1.33 mmol/L    POC Sodium 141 136 - 145 mmol/L    POC Potassium 3.7 3.5 - 5.1 mmol/L    POC Chloride 104 98 - 107 mmol/L    POC TCO2 28.1 21 - 32 mmol/L    Anion Gap, POC 8.9 (L) 10 - 20 mmol/L    POC Glucose 91 74 - 99 mg/dL    POC Creatinine 0.64 0.6 - 1.3 mg/dL    eGFR, POC >90 >60 ml/min/1.73m2    UA Comment Comment Not Indicated.       Med sat in room temp for 30 minutes before administered. SQ injection given into Left arm and LLQ today.     Medications Administered         romosozumab-aqqg (EVENITY) injection 105 mg Admin Date  2024 Action  Given Dose  105 mg Route  SubCUTAneous Documented By  Dali Brown RN        romosozumab-aqqg (EVENITY) injection 105 mg Admin Date  2024 Action  Given Dose  105 mg Route  SubCUTAneous Documented By  Dali Brown, RN             Ms. Corbett tolerated treatment well and was discharged from [facility] in stable condition at 1235.  She is to return on  2024 at 1000 for her next appointment.    Dali Brown RN  2024    Future Appointments:  Future Appointments   Date Time Provider Department Center   2024  3:00 PM Lakisha Quigley  MD JU[facility] BS AMB   12/6/2024 10:00 AM SS FASTTRACK 1 [facility]   1/7/2025  2:[facility]   2/6/2025 10:30 AM [facility]   3/7/2025 10:30 AM SS FASTTRACK 3 [facility]

## 2024-11-11 ENCOUNTER — OFFICE VISIT (OUTPATIENT)
Age: 69
End: 2024-11-11
Payer: MEDICARE

## 2024-11-11 VITALS
DIASTOLIC BLOOD PRESSURE: 71 MMHG | OXYGEN SATURATION: 100 % | TEMPERATURE: 98.1 F | WEIGHT: 136 LBS | RESPIRATION RATE: 18 BRPM | BODY MASS INDEX: 23.22 KG/M2 | SYSTOLIC BLOOD PRESSURE: 113 MMHG | HEART RATE: 75 BPM | HEIGHT: 64 IN

## 2024-11-11 DIAGNOSIS — M80.00XA OSTEOPOROSIS WITH PATHOLOGICAL FRACTURE, INITIAL ENCOUNTER: Primary | ICD-10-CM

## 2024-11-11 DIAGNOSIS — E55.9 VITAMIN D DEFICIENCY: ICD-10-CM

## 2024-11-11 PROCEDURE — G8427 DOCREV CUR MEDS BY ELIG CLIN: HCPCS | Performed by: INTERNAL MEDICINE

## 2024-11-11 PROCEDURE — 1126F AMNT PAIN NOTED NONE PRSNT: CPT | Performed by: INTERNAL MEDICINE

## 2024-11-11 PROCEDURE — 3017F COLORECTAL CA SCREEN DOC REV: CPT | Performed by: INTERNAL MEDICINE

## 2024-11-11 PROCEDURE — G2211 COMPLEX E/M VISIT ADD ON: HCPCS | Performed by: INTERNAL MEDICINE

## 2024-11-11 PROCEDURE — G8484 FLU IMMUNIZE NO ADMIN: HCPCS | Performed by: INTERNAL MEDICINE

## 2024-11-11 PROCEDURE — 1160F RVW MEDS BY RX/DR IN RCRD: CPT | Performed by: INTERNAL MEDICINE

## 2024-11-11 PROCEDURE — 1159F MED LIST DOCD IN RCRD: CPT | Performed by: INTERNAL MEDICINE

## 2024-11-11 PROCEDURE — 1036F TOBACCO NON-USER: CPT | Performed by: INTERNAL MEDICINE

## 2024-11-11 PROCEDURE — 1090F PRES/ABSN URINE INCON ASSESS: CPT | Performed by: INTERNAL MEDICINE

## 2024-11-11 PROCEDURE — 1123F ACP DISCUSS/DSCN MKR DOCD: CPT | Performed by: INTERNAL MEDICINE

## 2024-11-11 PROCEDURE — G8420 CALC BMI NORM PARAMETERS: HCPCS | Performed by: INTERNAL MEDICINE

## 2024-11-11 PROCEDURE — G8399 PT W/DXA RESULTS DOCUMENT: HCPCS | Performed by: INTERNAL MEDICINE

## 2024-11-11 PROCEDURE — 99214 OFFICE O/P EST MOD 30 MIN: CPT | Performed by: INTERNAL MEDICINE

## 2024-11-11 NOTE — PROGRESS NOTES
Terri Corbett is a 69 y.o. female here for   Chief Complaint   Patient presents with    Osteoporosis       1. Have you been to the ER, urgent care clinic since your last visit?  Hospitalized since your last visit? -no    2. Have you seen or consulted any other health care providers outside of the [facility] since your last visit?  Include any pap smears or colon screening.-Orth[facility]

## 2024-11-11 NOTE — PROGRESS NOTES
[facility] DIABETES AND ENDOCRINOLOGY               aLkisha Quigley MD             Patient Information  Date:11/11/2024  Name : Terri Corbett 69 y.o.     YOB: 1955         Referred by: Ernestina Rizvi MD       Chief Complaint   Patient presents with    Osteoporosis       History of Present Illness: Terri Corbett is a 69 y.o. female here for f/u of osteoporosis.    she was found to have osteoporosis based on the BMD in 2024, lumbar spine is the weakest, recent T6, T8 compression fracture, low trauma, s/p kyphoplasty, biopsy no malignancy  Celiac disease diagnosed at age 50, on gluten-free diet  No  premature menopause  +  loss of height      Nov 2024  History of Present Illness    She has completed 5 doses of Evenity with minor arm pain and swelling. Her Evenity treatment began in 08/2024 and will conclude in 07/2025.    History of rotator cuff surgery. No chest pain, heart conditions, recent dental work, or falls.    Bone health is generally good. Vitamin D level is 43.     Considering LIFTMORE study but currently unable to lift weights due to arm rehabilitation. History of swimming and triathlon participation.        Wt Readings from Last 3 Encounters:   11/11/24 61.7 kg (136 lb)   11/05/24 60.9 kg (134 lb 3.2 oz)   10/04/24 60.9 kg (134 lb 3.2 oz)        Past Medical History:   Diagnosis Date    Celiac disease 10/2005    Compression fracture of thoracic spine, non-traumatic, initial encounter (HCC)     Hypercholesterolemia        Current Outpatient Medications   Medication Sig    omega-3 acid ethyl esters (LOVAZA) 1 g capsule TAKE 2 CAPSULES BY MOUTH TWO TIMES DAILY    Multiple Vitamin (MULTIVITAMIN ADULT) TABS Take 1 tablet by mouth daily 4 per serving    Calcium Carb-Cholecalciferol (CALCIUM + VITAMIN D3 PO) Take 2 tablets by mouth in the morning and at bedtime Per 2 cap serving : Calcium 400 mg 1000 unit K - 100 mcg    Probiotic Product (PROBIOTIC PO) Take 1 capsule by mouth daily    cetirizine

## 2024-12-06 ENCOUNTER — HOSPITAL ENCOUNTER (OUTPATIENT)
Facility: HOSPITAL | Age: 69
Setting detail: INFUSION SERIES
Discharge: HOME | End: 2024-12-06
Payer: MEDICARE

## 2024-12-06 VITALS
HEIGHT: 64 IN | WEIGHT: 135.5 LBS | SYSTOLIC BLOOD PRESSURE: 128 MMHG | HEART RATE: 70 BPM | BODY MASS INDEX: 23.13 KG/M2 | OXYGEN SATURATION: 97 % | TEMPERATURE: 97.8 F | DIASTOLIC BLOOD PRESSURE: 74 MMHG | RESPIRATION RATE: 18 BRPM

## 2024-12-06 DIAGNOSIS — M81.0 OSTEOPOROSIS WITHOUT CURRENT PATHOLOGICAL FRACTURE, UNSPECIFIED OSTEOPOROSIS TYPE: Primary | ICD-10-CM

## 2024-12-06 LAB
ANION GAP BLD CALC-SCNC: 12.8 MMOL/L (ref 10–20)
CA-I BLD-MCNC: 1.22 MMOL/L (ref 1.15–1.33)
CHLORIDE BLD-SCNC: 101 MMOL/L (ref 98–107)
CO2 BLD-SCNC: 26.2 MMOL/L (ref 21–32)
CREAT BLD-MCNC: 0.64 MG/DL (ref 0.6–1.3)
GLUCOSE BLD-MCNC: 91 MG/DL (ref 74–99)
POTASSIUM BLD-SCNC: 3.9 MMOL/L (ref 3.5–5.1)
SERVICE CMNT-IMP: NORMAL
SODIUM BLD-SCNC: 140 MMOL/L (ref 136–145)

## 2024-12-06 PROCEDURE — 96372 THER/PROPH/DIAG INJ SC/IM: CPT

## 2024-12-06 PROCEDURE — 80047 BASIC METABLC PNL IONIZED CA: CPT

## 2024-12-06 PROCEDURE — 6360000002 HC RX W HCPCS: Performed by: INTERNAL MEDICINE

## 2024-12-06 RX ORDER — SODIUM CHLORIDE 9 MG/ML
INJECTION, SOLUTION INTRAVENOUS CONTINUOUS
OUTPATIENT
Start: 2025-01-03

## 2024-12-06 RX ORDER — ACETAMINOPHEN 325 MG/1
650 TABLET ORAL
OUTPATIENT
Start: 2025-01-03

## 2024-12-06 RX ORDER — FAMOTIDINE 10 MG/ML
20 INJECTION, SOLUTION INTRAVENOUS
OUTPATIENT
Start: 2025-01-03

## 2024-12-06 RX ORDER — EPINEPHRINE 1 MG/ML
0.3 INJECTION, SOLUTION INTRAMUSCULAR; SUBCUTANEOUS PRN
OUTPATIENT
Start: 2025-01-03

## 2024-12-06 RX ORDER — HYDROCORTISONE SODIUM SUCCINATE 100 MG/2ML
100 INJECTION INTRAMUSCULAR; INTRAVENOUS
OUTPATIENT
Start: 2025-01-03

## 2024-12-06 RX ORDER — ALBUTEROL SULFATE 90 UG/1
4 INHALANT RESPIRATORY (INHALATION) PRN
OUTPATIENT
Start: 2025-01-03

## 2024-12-06 RX ORDER — DIPHENHYDRAMINE HYDROCHLORIDE 50 MG/ML
50 INJECTION INTRAMUSCULAR; INTRAVENOUS
OUTPATIENT
Start: 2025-01-03

## 2024-12-06 RX ORDER — ONDANSETRON 2 MG/ML
8 INJECTION INTRAMUSCULAR; INTRAVENOUS
OUTPATIENT
Start: 2025-01-03

## 2024-12-06 RX ADMIN — ROMOSOZUMAB-AQQG 105 MG: 105 INJECTION, SOLUTION SUBCUTANEOUS at 10:59

## 2024-12-06 RX ADMIN — ROMOSOZUMAB-AQQG 105 MG: 105 INJECTION, SOLUTION SUBCUTANEOUS at 10:57

## 2024-12-06 ASSESSMENT — PAIN SCALES - GENERAL: PAINLEVEL_OUTOF10: 1

## 2024-12-06 ASSESSMENT — PAIN DESCRIPTION - LOCATION: LOCATION: SHOULDER

## 2024-12-06 ASSESSMENT — PAIN DESCRIPTION - PAIN TYPE: TYPE: CHRONIC PAIN

## 2024-12-06 ASSESSMENT — PAIN DESCRIPTION - ORIENTATION: ORIENTATION: RIGHT

## 2024-12-06 ASSESSMENT — PAIN DESCRIPTION - DESCRIPTORS: DESCRIPTORS: ACHING

## 2024-12-06 NOTE — PROGRESS NOTES
[facility] Progress Note        Date: 2024    Name: Terri Corbett    MRN: [id number]         : 1955    Ms. Corbett Arrived ambulatory and in no distress for Evernity Regimen.  Assessment was completed, no acute issues at this time, no new complaints voiced. Lab drawn peripherally from [facility] and sent for processing. Pt denied having recent invasive dental procedure. Criteria are met for today treatment.      Ms. Corbett's vitals were reviewed.  Patient Vitals for the past 12 hrs:   Temp Pulse Resp BP SpO2   24 1007 97.8 °F (36.6 °C) 70 18 128/74 97 %         Lab results were obtained and reviewed.  Recent Results (from the past 12 hour(s))   POC CHEM 8    Collection Time: 24 10:19 AM   Result Value Ref Range    POC Ionized Calcium 1.22 1.15 - 1.33 mmol/L    POC Sodium 140 136 - 145 mmol/L    POC Potassium 3.9 3.5 - 5.1 mmol/L    POC Chloride 101 98 - 107 mmol/L    POC TCO2 26.2 21 - 32 mmol/L    Anion Gap, POC 12.8 10 - 20 mmol/L    POC Glucose 91 74 - 99 mg/dL    POC Creatinine 0.64 0.6 - 1.3 mg/dL    eGFR, POC >90 >60 ml/min/1.73m2    UA Comment Comment Not Indicated.         Medications received:  Medications Administered         romosozumab-aqqg (EVENITY) injection 105 mg Admin Date  2024 Action  Given Dose  105 mg Route  SubCUTAneous Documented By  Dali Brown RN        romosozumab-aqqg (EVENITY) injection 105 mg Admin Date  2024 Action  Given Dose  105 mg Route  SubCUTAneous Documented By  Dali Brown RN          SQ injections given in Left upper arm and LLQ today.     Ms. Corbett tolerated treatment well and was discharged from [facility] in stable condition at 1100. She is to return on  2025 at 1400 for her next appointment.    Dali Brown RN  2024    Future Appointments:  Future Appointments   Date Time Provider Department Center   1/3/2025 10:45 AM [facility] ERNIE 2 [facility]   2025  2:00 PM SS FASTTRACK 3 [facility]   2/6/2025 10:30 AM SS FASTTRACK 3 [facility]   3/7/2025 10:30 AM SS FASTTRACK 3 [facility]   7/14/2025 10:30 AM Lakisha Quigley MD DIABSHEYRLO BS AMB

## 2024-12-12 DIAGNOSIS — E78.2 MODERATE MIXED HYPERLIPIDEMIA NOT REQUIRING STATIN THERAPY: ICD-10-CM

## 2024-12-12 RX ORDER — OMEGA-3-ACID ETHYL ESTERS 1 G/1
CAPSULE, LIQUID FILLED ORAL
Qty: 60 CAPSULE | Refills: 3 | Status: SHIPPED | OUTPATIENT
Start: 2024-12-12

## 2025-01-03 ENCOUNTER — HOSPITAL ENCOUNTER (OUTPATIENT)
Facility: HOSPITAL | Age: 70
Discharge: HOME | End: 2025-01-03
Payer: MEDICARE

## 2025-01-03 ENCOUNTER — TRANSCRIBE ORDERS (OUTPATIENT)
Facility: HOSPITAL | Age: 70
End: 2025-01-03

## 2025-01-03 VITALS — WEIGHT: 135 LBS | HEIGHT: 64 IN | BODY MASS INDEX: 23.05 KG/M2

## 2025-01-03 DIAGNOSIS — Z12.31 OTHER SCREENING MAMMOGRAM: ICD-10-CM

## 2025-01-03 DIAGNOSIS — Z12.31 OTHER SCREENING MAMMOGRAM: Primary | ICD-10-CM

## 2025-01-03 PROCEDURE — 77063 BREAST TOMOSYNTHESIS BI: CPT

## 2025-01-07 ENCOUNTER — HOSPITAL ENCOUNTER (OUTPATIENT)
Facility: HOSPITAL | Age: 70
Setting detail: INFUSION SERIES
Discharge: HOME | End: 2025-01-07
Payer: MEDICARE

## 2025-01-07 VITALS
OXYGEN SATURATION: 97 % | BODY MASS INDEX: 23.69 KG/M2 | RESPIRATION RATE: 18 BRPM | WEIGHT: 138 LBS | HEART RATE: 76 BPM | SYSTOLIC BLOOD PRESSURE: 130 MMHG | TEMPERATURE: 97.3 F | DIASTOLIC BLOOD PRESSURE: 82 MMHG

## 2025-01-07 DIAGNOSIS — M81.0 OSTEOPOROSIS WITHOUT CURRENT PATHOLOGICAL FRACTURE, UNSPECIFIED OSTEOPOROSIS TYPE: Primary | ICD-10-CM

## 2025-01-07 LAB
ANION GAP BLD CALC-SCNC: 9.7 MMOL/L (ref 10–20)
CA-I BLD-MCNC: 1.3 MMOL/L (ref 1.15–1.33)
CHLORIDE BLD-SCNC: 100 MMOL/L (ref 98–107)
CO2 BLD-SCNC: 29.3 MMOL/L (ref 21–32)
CREAT BLD-MCNC: 0.7 MG/DL (ref 0.6–1.3)
GLUCOSE BLD-MCNC: 90 MG/DL (ref 74–99)
POTASSIUM BLD-SCNC: 3.8 MMOL/L (ref 3.5–5.1)
SERVICE CMNT-IMP: ABNORMAL
SODIUM BLD-SCNC: 139 MMOL/L (ref 136–145)

## 2025-01-07 PROCEDURE — 96372 THER/PROPH/DIAG INJ SC/IM: CPT

## 2025-01-07 PROCEDURE — 80047 BASIC METABLC PNL IONIZED CA: CPT

## 2025-01-07 PROCEDURE — 6360000002 HC RX W HCPCS: Performed by: INTERNAL MEDICINE

## 2025-01-07 RX ORDER — FAMOTIDINE 10 MG/ML
20 INJECTION, SOLUTION INTRAVENOUS
OUTPATIENT
Start: 2025-01-28

## 2025-01-07 RX ORDER — EPINEPHRINE 1 MG/ML
0.3 INJECTION, SOLUTION INTRAMUSCULAR; SUBCUTANEOUS PRN
OUTPATIENT
Start: 2025-01-28

## 2025-01-07 RX ORDER — HYDROCORTISONE SODIUM SUCCINATE 100 MG/2ML
100 INJECTION INTRAMUSCULAR; INTRAVENOUS
OUTPATIENT
Start: 2025-01-28

## 2025-01-07 RX ORDER — DIPHENHYDRAMINE HYDROCHLORIDE 50 MG/ML
50 INJECTION INTRAMUSCULAR; INTRAVENOUS
OUTPATIENT
Start: 2025-01-28

## 2025-01-07 RX ORDER — ACETAMINOPHEN 325 MG/1
650 TABLET ORAL
OUTPATIENT
Start: 2025-01-28

## 2025-01-07 RX ORDER — ALBUTEROL SULFATE 90 UG/1
4 INHALANT RESPIRATORY (INHALATION) PRN
OUTPATIENT
Start: 2025-01-28

## 2025-01-07 RX ORDER — SODIUM CHLORIDE 9 MG/ML
INJECTION, SOLUTION INTRAVENOUS CONTINUOUS
OUTPATIENT
Start: 2025-01-28

## 2025-01-07 RX ORDER — ONDANSETRON 2 MG/ML
8 INJECTION INTRAMUSCULAR; INTRAVENOUS
OUTPATIENT
Start: 2025-01-28

## 2025-01-07 RX ADMIN — ROMOSOZUMAB-AQQG 105 MG: 105 INJECTION, SOLUTION SUBCUTANEOUS at 14:45

## 2025-01-07 ASSESSMENT — PAIN SCALES - GENERAL: PAINLEVEL_OUTOF10: 0

## 2025-01-07 NOTE — PROGRESS NOTES
[facility] Progress Note    Date: 2025    Name: Terri Corbett    MRN: [id number]         : 1955    Ms. Corbett Arrived ambulatory and in no distress for Evenity Injection.  Assessment was completed, no acute issues at this time, no new complaints voiced.Labs drawn peripherally and processed on EPO.     Patient denies recent dental work.       Ms. Corbett's vitals were reviewed.  Vitals:    25 1400   BP: 130/82   Pulse: 76   Resp: 18   Temp: 97.3 °F (36.3 °C)   SpO2: 97%     Recent Results (from the past 12 hour(s))   POC CHEM 8    Collection Time: 25  2:08 PM   Result Value Ref Range    POC Ionized Calcium 1.30 1.15 - 1.33 mmol/L    POC Sodium 139 136 - 145 mmol/L    POC Potassium 3.8 3.5 - 5.1 mmol/L    POC Chloride 100 98 - 107 mmol/L    POC TCO2 29.3 21 - 32 mmol/L    Anion Gap, POC 9.7 (L) 10 - 20 mmol/L    POC Glucose 90 74 - 99 mg/dL    POC Creatinine 0.70 0.6 - 1.3 mg/dL    eGFR, POC >90 >60 ml/min/1.73m2    UA Comment Comment Not Indicated.       Injection left out for 30 minutes to come to room temperature prior to administration.     Medications:  Medications Administered         romosozumab-aqqg (EVENITY) injection 105 mg Admin Date  2025 Action  Given Dose  105 mg Route  SubCUTAneous Documented By  Lea Brady RN        romosozumab-aqqg (EVENITY) injection 105 mg Admin Date  2025 Action  Given Dose  105 mg Route  SubCUTAneous Documented By  Lea Brady RN            Given in the left arm and LLQ of abdomen at the patients request r/t rotator cuff surgery to the right arm.     Ms. Corbett tolerated treatment well and was discharged from [facility] in stable condition.   Patient is aware of future appointments.    Future Appointments   Date Time Provider Department Center   2025 10:30 AM SS FASTTRACK 3 [facility]   3/7/2025 10:30 AM SS FASTTRACK 3 [facility]   2025 10:30 AM Lakisha Quigley MD DIABENDO BS AMB        Lea Brady RN  January 7, 2025

## 2025-01-08 ENCOUNTER — TELEPHONE (OUTPATIENT)
Age: 70
End: 2025-01-08

## 2025-01-08 DIAGNOSIS — R73.09 IMPAIRED GLUCOSE TOLERANCE TEST: ICD-10-CM

## 2025-01-08 DIAGNOSIS — E78.2 MIXED HYPERLIPIDEMIA: Primary | ICD-10-CM

## 2025-01-08 DIAGNOSIS — Z13.1 ENCOUNTER FOR SCREENING FOR DIABETES MELLITUS: ICD-10-CM

## 2025-01-20 ENCOUNTER — COMMUNITY OUTREACH (OUTPATIENT)
Age: 70
End: 2025-01-20

## 2025-01-20 NOTE — PROGRESS NOTES
Patient's HM shows they are overdue for Colorectal Screening.   Care Everywhere and  files searched.   up to date with most recent colonoscopy.

## 2025-02-03 ENCOUNTER — OFFICE VISIT (OUTPATIENT)
Age: 70
End: 2025-02-03

## 2025-02-03 VITALS
BODY MASS INDEX: 22.88 KG/M2 | DIASTOLIC BLOOD PRESSURE: 64 MMHG | TEMPERATURE: 97.8 F | WEIGHT: 134 LBS | HEIGHT: 64 IN | SYSTOLIC BLOOD PRESSURE: 93 MMHG | OXYGEN SATURATION: 96 % | RESPIRATION RATE: 18 BRPM | HEART RATE: 68 BPM

## 2025-02-03 DIAGNOSIS — H10.9 BACTERIAL CONJUNCTIVITIS OF LEFT EYE: Primary | ICD-10-CM

## 2025-02-03 RX ORDER — NEOMYCIN AND POLYMYXIN B SULFATES AND BACITRACIN ZINC 400; 3.5; 1 [USP'U]/G; MG/G; [USP'U]/G
0.5 OINTMENT OPHTHALMIC 4 TIMES DAILY
Qty: 3.5 G | Refills: 0 | Status: SHIPPED | OUTPATIENT
Start: 2025-02-03

## 2025-02-03 RX ORDER — NEOMYCIN AND POLYMYXIN B SULFATES AND BACITRACIN ZINC 400; 3.5; 1 [USP'U]/G; MG/G; [USP'U]/G
0.5 OINTMENT OPHTHALMIC 4 TIMES DAILY
Qty: 3.5 G | Refills: 0 | Status: SHIPPED | OUTPATIENT
Start: 2025-02-03 | End: 2025-02-03

## 2025-02-03 NOTE — PROGRESS NOTES
Terri Corbett (:  1955) is a 69 y.o. female,New patient, here for evaluation of the following chief complaint(s):  Conjunctivitis (Pt c/o possible pink that started yesterday. Red and lots of drainage )         Assessment & Plan  Bacterial conjunctivitis of left eye    Will treat per history and exam.  Red flag signs and strict return / ER precautions were discussed, and the patient expressed understanding and agreement to the plan. All questions were answered.     Orders:    Neomycin-Bacitracin Zn-Polymyx [id number] OINT; Apply 0.5 cm to eye in the morning, at noon, in the evening, and at bedtime      Return in about 5 days (around 2025), or if symptoms worsen or fail to improve.       Subjective   Patient complaining of left eye irritation which started yesterday. Then this morning she woke up and had to wipe the \"goo\" away in order to open her left eye. She has never had pink eye, at least not in a long time. She has sick contacts at home with granddaughter although no eye issues with her. For the 2 days preceding this, she had been dealing with a URI type of illness with a lot of nose blowing and congestion.       History provided by:  Patient   used: No        Review of Systems   All other systems reviewed and are negative.         Objective   Physical Exam  Constitutional:       General: She is not in acute distress.     Appearance: Normal appearance. She is not ill-appearing.   HENT:      Head: Normocephalic and atraumatic.      Right Ear: External ear normal.      Left Ear: External ear normal.      Nose: Nose normal.   Eyes:      General: No scleral icterus.        Right eye: No discharge.         Left eye: Discharge (left eye with conjunctival injection and mucopurulent discharge) present.     Extraocular Movements: Extraocular movements intact.      Conjunctiva/sclera: Conjunctivae normal.   Cardiovascular:      Rate and Rhythm: Normal rate and regular rhythm.

## 2025-02-06 ENCOUNTER — HOSPITAL ENCOUNTER (OUTPATIENT)
Facility: HOSPITAL | Age: 70
Setting detail: INFUSION SERIES
Discharge: HOME | End: 2025-02-06
Payer: MEDICARE

## 2025-02-06 VITALS
HEIGHT: 64 IN | HEART RATE: 81 BPM | SYSTOLIC BLOOD PRESSURE: 109 MMHG | DIASTOLIC BLOOD PRESSURE: 76 MMHG | BODY MASS INDEX: 23.12 KG/M2 | OXYGEN SATURATION: 98 % | WEIGHT: 135.4 LBS | RESPIRATION RATE: 18 BRPM | TEMPERATURE: 98.1 F

## 2025-02-06 DIAGNOSIS — M81.0 OSTEOPOROSIS WITHOUT CURRENT PATHOLOGICAL FRACTURE, UNSPECIFIED OSTEOPOROSIS TYPE: Primary | ICD-10-CM

## 2025-02-06 LAB
ANION GAP BLD CALC-SCNC: 10.6 MMOL/L (ref 10–20)
CA-I BLD-MCNC: 1.28 MMOL/L (ref 1.15–1.33)
CHLORIDE BLD-SCNC: 102 MMOL/L (ref 98–107)
CO2 BLD-SCNC: 29.4 MMOL/L (ref 21–32)
CREAT BLD-MCNC: 0.74 MG/DL (ref 0.6–1.3)
GLUCOSE BLD-MCNC: 89 MG/DL (ref 74–99)
POTASSIUM BLD-SCNC: 4 MMOL/L (ref 3.5–5.1)
SERVICE CMNT-IMP: NORMAL
SODIUM BLD-SCNC: 142 MMOL/L (ref 136–145)

## 2025-02-06 PROCEDURE — 6360000002 HC RX W HCPCS: Performed by: INTERNAL MEDICINE

## 2025-02-06 PROCEDURE — 96372 THER/PROPH/DIAG INJ SC/IM: CPT

## 2025-02-06 PROCEDURE — 80047 BASIC METABLC PNL IONIZED CA: CPT

## 2025-02-06 RX ORDER — SODIUM CHLORIDE 9 MG/ML
INJECTION, SOLUTION INTRAVENOUS CONTINUOUS
OUTPATIENT
Start: 2025-03-02

## 2025-02-06 RX ORDER — ALBUTEROL SULFATE 90 UG/1
4 INHALANT RESPIRATORY (INHALATION) PRN
OUTPATIENT
Start: 2025-03-02

## 2025-02-06 RX ORDER — FAMOTIDINE 10 MG/ML
20 INJECTION, SOLUTION INTRAVENOUS
OUTPATIENT
Start: 2025-03-02

## 2025-02-06 RX ORDER — EPINEPHRINE 1 MG/ML
0.3 INJECTION, SOLUTION INTRAMUSCULAR; SUBCUTANEOUS PRN
OUTPATIENT
Start: 2025-03-02

## 2025-02-06 RX ORDER — ONDANSETRON 2 MG/ML
8 INJECTION INTRAMUSCULAR; INTRAVENOUS
OUTPATIENT
Start: 2025-03-02

## 2025-02-06 RX ORDER — DIPHENHYDRAMINE HYDROCHLORIDE 50 MG/ML
50 INJECTION INTRAMUSCULAR; INTRAVENOUS
OUTPATIENT
Start: 2025-03-02

## 2025-02-06 RX ORDER — HYDROCORTISONE SODIUM SUCCINATE 100 MG/2ML
100 INJECTION INTRAMUSCULAR; INTRAVENOUS
OUTPATIENT
Start: 2025-03-02

## 2025-02-06 RX ORDER — ACETAMINOPHEN 325 MG/1
650 TABLET ORAL
OUTPATIENT
Start: 2025-03-02

## 2025-02-06 RX ADMIN — ROMOSOZUMAB-AQQG 105 MG: 105 INJECTION, SOLUTION SUBCUTANEOUS at 11:47

## 2025-02-06 RX ADMIN — ROMOSOZUMAB-AQQG 105 MG: 105 INJECTION, SOLUTION SUBCUTANEOUS at 11:44

## 2025-02-06 ASSESSMENT — PAIN SCALES - GENERAL: PAINLEVEL_OUTOF10: 0

## 2025-02-06 NOTE — PROGRESS NOTES
[facility] Progress Note        Date: 25    Name: Terri Corbett    MRN: [id number]         : 1955    MD: Lakisha Quigley MD       Ms. Corbett admitted to [facility] for Evenity Injection ambulatory in stable condition. Assessment completed, no acute issues at this time. No new concerns voiced. Patient denies jaw pain and/or swelling. Patient denies increased pain in the hips, groin, and thighs. Patient denies recent or upcoming invasive dental work. Labs drawn peripherally and sent for processing.      ** Patient has received this medication in the past. Patient reports no previous reactions to the medication(s).        Vitals:    25 1048   BP: 109/76   Pulse: 81   Resp: 18   Temp: 98.1 °F (36.7 °C)   TempSrc: Temporal   SpO2: 98%   Weight: 61.4 kg (135 lb 6.4 oz)   Height: 1.626 m (5' 4\")         Labs were obtained and reviewed. Criteria met for treatment today.    Results for orders placed or performed during the hospital encounter of 25   POC CHEM 8   Result Value Ref Range    POC Ionized Calcium 1.28 1.15 - 1.33 mmol/L    POC Sodium 142 136 - 145 mmol/L    POC Potassium 4.0 3.5 - 5.1 mmol/L    POC Chloride 102 98 - 107 mmol/L    POC TCO2 29.4 21 - 32 mmol/L    Anion Gap, POC 10.6 10 - 20 mmol/L    POC Glucose 89 74 - 99 mg/dL    POC Creatinine 0.74 0.6 - 1.3 mg/dL    eGFR, POC 88 >60 ml/min/1.73m2    UA Comment Comment Not Indicated.           Medication allowed to warm to room temperature for 30 minutes prior to administration.     Medications:  MEDICATIONS GIVEN:  Medications Administered         romosozumab-aqqg (EVENITY) injection 105 mg Admin Date  2025 Action  Given Dose  105 mg Route  SubCUTAneous Documented By  Kyle Ryan, OLU        romosozumab-aqqg (EVENITY) injection 105 mg Admin Date  2025 Action  Given Dose  105 mg Route  SubCUTAneous Documented By  Kyle Ryan, RN          Administered subq back of left upper arm and LLQ abdominal

## 2025-02-13 ENCOUNTER — LAB (OUTPATIENT)
Age: 70
End: 2025-02-13

## 2025-02-13 DIAGNOSIS — E78.2 MIXED HYPERLIPIDEMIA: ICD-10-CM

## 2025-02-13 DIAGNOSIS — Z13.1 ENCOUNTER FOR SCREENING FOR DIABETES MELLITUS: ICD-10-CM

## 2025-02-13 DIAGNOSIS — R73.09 IMPAIRED GLUCOSE TOLERANCE TEST: ICD-10-CM

## 2025-02-13 LAB
ALBUMIN SERPL-MCNC: 3.8 G/DL (ref 3.5–5)
ALBUMIN/GLOB SERPL: 1.2 (ref 1.1–2.2)
ALP SERPL-CCNC: 96 U/L (ref 45–117)
ALT SERPL-CCNC: 39 U/L (ref 12–78)
ANION GAP SERPL CALC-SCNC: 6 MMOL/L (ref 2–12)
AST SERPL-CCNC: 24 U/L (ref 15–37)
BILIRUB SERPL-MCNC: 0.8 MG/DL (ref 0.2–1)
BUN SERPL-MCNC: 17 MG/DL (ref 6–20)
BUN/CREAT SERPL: 24 (ref 12–20)
CALCIUM SERPL-MCNC: 9.6 MG/DL (ref 8.5–10.1)
CHLORIDE SERPL-SCNC: 103 MMOL/L (ref 97–108)
CHOLEST SERPL-MCNC: 271 MG/DL
CO2 SERPL-SCNC: 28 MMOL/L (ref 21–32)
CREAT SERPL-MCNC: 0.7 MG/DL (ref 0.55–1.02)
GLOBULIN SER CALC-MCNC: 3.3 G/DL (ref 2–4)
GLUCOSE P FAST SERPL-MCNC: 92 MG/DL (ref 65–100)
GLUCOSE SERPL-MCNC: 92 MG/DL (ref 65–100)
HDLC SERPL-MCNC: 72 MG/DL
HDLC SERPL: 3.8 (ref 0–5)
LDLC SERPL CALC-MCNC: 185 MG/DL (ref 0–100)
POTASSIUM SERPL-SCNC: 4.3 MMOL/L (ref 3.5–5.1)
PROT SERPL-MCNC: 7.1 G/DL (ref 6.4–8.2)
SODIUM SERPL-SCNC: 137 MMOL/L (ref 136–145)
TRIGL SERPL-MCNC: 70 MG/DL
VLDLC SERPL CALC-MCNC: 14 MG/DL

## 2025-02-16 SDOH — ECONOMIC STABILITY: FOOD INSECURITY: WITHIN THE PAST 12 MONTHS, THE FOOD YOU BOUGHT JUST DIDN'T LAST AND YOU DIDN'T HAVE MONEY TO GET MORE.: NEVER TRUE

## 2025-02-16 SDOH — ECONOMIC STABILITY: INCOME INSECURITY: IN THE LAST 12 MONTHS, WAS THERE A TIME WHEN YOU WERE NOT ABLE TO PAY THE MORTGAGE OR RENT ON TIME?: NO

## 2025-02-16 SDOH — HEALTH STABILITY: PHYSICAL HEALTH: ON AVERAGE, HOW MANY DAYS PER WEEK DO YOU ENGAGE IN MODERATE TO STRENUOUS EXERCISE (LIKE A BRISK WALK)?: 4 DAYS

## 2025-02-16 SDOH — ECONOMIC STABILITY: FOOD INSECURITY: WITHIN THE PAST 12 MONTHS, YOU WORRIED THAT YOUR FOOD WOULD RUN OUT BEFORE YOU GOT MONEY TO BUY MORE.: NEVER TRUE

## 2025-02-16 SDOH — HEALTH STABILITY: PHYSICAL HEALTH: ON AVERAGE, HOW MANY MINUTES DO YOU ENGAGE IN EXERCISE AT THIS LEVEL?: 50 MIN

## 2025-02-16 SDOH — ECONOMIC STABILITY: TRANSPORTATION INSECURITY
IN THE PAST 12 MONTHS, HAS THE LACK OF TRANSPORTATION KEPT YOU FROM MEDICAL APPOINTMENTS OR FROM GETTING MEDICATIONS?: NO

## 2025-02-16 ASSESSMENT — PATIENT HEALTH QUESTIONNAIRE - PHQ9
1. LITTLE INTEREST OR PLEASURE IN DOING THINGS: NOT AT ALL
SUM OF ALL RESPONSES TO PHQ QUESTIONS 1-9: 0
SUM OF ALL RESPONSES TO PHQ QUESTIONS 1-9: 0
2. FEELING DOWN, DEPRESSED OR HOPELESS: NOT AT ALL
SUM OF ALL RESPONSES TO PHQ QUESTIONS 1-9: 0
SUM OF ALL RESPONSES TO PHQ QUESTIONS 1-9: 0

## 2025-02-16 ASSESSMENT — LIFESTYLE VARIABLES
HAVE YOU OR SOMEONE ELSE BEEN INJURED AS A RESULT OF YOUR DRINKING: NO
HOW MANY STANDARD DRINKS CONTAINING ALCOHOL DO YOU HAVE ON A TYPICAL DAY: 1 OR 2
HOW OFTEN DURING THE LAST YEAR HAVE YOU HAD A FEELING OF GUILT OR REMORSE AFTER DRINKING: NEVER
HOW OFTEN DURING THE LAST YEAR HAVE YOU BEEN UNABLE TO REMEMBER WHAT HAPPENED THE NIGHT BEFORE BECAUSE YOU HAD BEEN DRINKING: NEVER
HOW OFTEN DO YOU HAVE A DRINK CONTAINING ALCOHOL: 4 OR MORE TIMES A WEEK
HOW OFTEN DURING THE LAST YEAR HAVE YOU FOUND THAT YOU WERE NOT ABLE TO STOP DRINKING ONCE YOU HAD STARTED: NEVER
HOW OFTEN DURING THE LAST YEAR HAVE YOU NEEDED AN ALCOHOLIC DRINK FIRST THING IN THE MORNING TO GET YOURSELF GOING AFTER A NIGHT OF HEAVY DRINKING: NEVER
HAS A RELATIVE, FRIEND, DOCTOR, OR ANOTHER HEALTH PROFESSIONAL EXPRESSED CONCERN ABOUT YOUR DRINKING OR SUGGESTED YOU CUT DOWN: NO
HOW OFTEN DURING THE LAST YEAR HAVE YOU FAILED TO DO WHAT WAS NORMALLY EXPECTED FROM YOU BECAUSE OF DRINKING: NEVER

## 2025-03-02 SDOH — HEALTH STABILITY: PHYSICAL HEALTH: ON AVERAGE, HOW MANY MINUTES DO YOU ENGAGE IN EXERCISE AT THIS LEVEL?: 50 MIN

## 2025-03-02 SDOH — HEALTH STABILITY: PHYSICAL HEALTH: ON AVERAGE, HOW MANY DAYS PER WEEK DO YOU ENGAGE IN MODERATE TO STRENUOUS EXERCISE (LIKE A BRISK WALK)?: 4 DAYS

## 2025-03-02 ASSESSMENT — LIFESTYLE VARIABLES
HOW OFTEN DURING THE LAST YEAR HAVE YOU BEEN UNABLE TO REMEMBER WHAT HAPPENED THE NIGHT BEFORE BECAUSE YOU HAD BEEN DRINKING: NEVER
HOW OFTEN DO YOU HAVE SIX OR MORE DRINKS ON ONE OCCASION: 1
HAVE YOU OR SOMEONE ELSE BEEN INJURED AS A RESULT OF YOUR DRINKING: NO
HAS A RELATIVE, FRIEND, DOCTOR, OR ANOTHER HEALTH PROFESSIONAL EXPRESSED CONCERN ABOUT YOUR DRINKING OR SUGGESTED YOU CUT DOWN: NO
HOW OFTEN DURING THE LAST YEAR HAVE YOU HAD A FEELING OF GUILT OR REMORSE AFTER DRINKING: NEVER
HOW OFTEN DURING THE LAST YEAR HAVE YOU BEEN UNABLE TO REMEMBER WHAT HAPPENED THE NIGHT BEFORE BECAUSE YOU HAD BEEN DRINKING: NEVER
HOW OFTEN DO YOU HAVE A DRINK CONTAINING ALCOHOL: 5
HOW OFTEN DURING THE LAST YEAR HAVE YOU HAD A FEELING OF GUILT OR REMORSE AFTER DRINKING: NEVER
HOW OFTEN DURING THE LAST YEAR HAVE YOU FAILED TO DO WHAT WAS NORMALLY EXPECTED FROM YOU BECAUSE OF DRINKING: NEVER
HOW OFTEN DURING THE LAST YEAR HAVE YOU NEEDED AN ALCOHOLIC DRINK FIRST THING IN THE MORNING TO GET YOURSELF GOING AFTER A NIGHT OF HEAVY DRINKING: NEVER
HAS A RELATIVE, FRIEND, DOCTOR, OR ANOTHER HEALTH PROFESSIONAL EXPRESSED CONCERN ABOUT YOUR DRINKING OR SUGGESTED YOU CUT DOWN: NO
HOW OFTEN DO YOU HAVE A DRINK CONTAINING ALCOHOL: 4 OR MORE TIMES A WEEK
HOW OFTEN DURING THE LAST YEAR HAVE YOU FOUND THAT YOU WERE NOT ABLE TO STOP DRINKING ONCE YOU HAD STARTED: NEVER
HAVE YOU OR SOMEONE ELSE BEEN INJURED AS A RESULT OF YOUR DRINKING: NO
HOW OFTEN DURING THE LAST YEAR HAVE YOU NEEDED AN ALCOHOLIC DRINK FIRST THING IN THE MORNING TO GET YOURSELF GOING AFTER A NIGHT OF HEAVY DRINKING: NEVER
HOW MANY STANDARD DRINKS CONTAINING ALCOHOL DO YOU HAVE ON A TYPICAL DAY: 1 OR 2
HOW OFTEN DURING THE LAST YEAR HAVE YOU FAILED TO DO WHAT WAS NORMALLY EXPECTED FROM YOU BECAUSE OF DRINKING: NEVER
HOW OFTEN DURING THE LAST YEAR HAVE YOU FOUND THAT YOU WERE NOT ABLE TO STOP DRINKING ONCE YOU HAD STARTED: NEVER
HOW MANY STANDARD DRINKS CONTAINING ALCOHOL DO YOU HAVE ON A TYPICAL DAY: 1

## 2025-03-02 ASSESSMENT — PATIENT HEALTH QUESTIONNAIRE - PHQ9
SUM OF ALL RESPONSES TO PHQ QUESTIONS 1-9: 0
SUM OF ALL RESPONSES TO PHQ QUESTIONS 1-9: 0
1. LITTLE INTEREST OR PLEASURE IN DOING THINGS: NOT AT ALL
SUM OF ALL RESPONSES TO PHQ QUESTIONS 1-9: 0
SUM OF ALL RESPONSES TO PHQ QUESTIONS 1-9: 0
2. FEELING DOWN, DEPRESSED OR HOPELESS: NOT AT ALL

## 2025-03-05 ENCOUNTER — OFFICE VISIT (OUTPATIENT)
Age: 70
End: 2025-03-05
Payer: MEDICARE

## 2025-03-05 ENCOUNTER — LAB (OUTPATIENT)
Age: 70
End: 2025-03-05
Payer: MEDICARE

## 2025-03-05 VITALS
BODY MASS INDEX: 22.91 KG/M2 | SYSTOLIC BLOOD PRESSURE: 112 MMHG | OXYGEN SATURATION: 100 % | HEART RATE: 75 BPM | DIASTOLIC BLOOD PRESSURE: 67 MMHG | TEMPERATURE: 97.5 F | HEIGHT: 64 IN | WEIGHT: 134.2 LBS | RESPIRATION RATE: 17 BRPM

## 2025-03-05 DIAGNOSIS — E74.39 GLUCOSE INTOLERANCE: ICD-10-CM

## 2025-03-05 DIAGNOSIS — R79.9 ABNORMAL FINDING OF BLOOD CHEMISTRY, UNSPECIFIED: ICD-10-CM

## 2025-03-05 DIAGNOSIS — I25.10 CORONARY ARTERY DISEASE INVOLVING NATIVE CORONARY ARTERY OF NATIVE HEART WITHOUT ANGINA PECTORIS: ICD-10-CM

## 2025-03-05 DIAGNOSIS — F41.1 GAD (GENERALIZED ANXIETY DISORDER): ICD-10-CM

## 2025-03-05 DIAGNOSIS — E78.2 MODERATE MIXED HYPERLIPIDEMIA NOT REQUIRING STATIN THERAPY: ICD-10-CM

## 2025-03-05 DIAGNOSIS — M80.80XD LOCALIZED OSTEOPOROSIS WITH CURRENT PATHOLOGICAL FRACTURE WITH ROUTINE HEALING, SUBSEQUENT ENCOUNTER: ICD-10-CM

## 2025-03-05 DIAGNOSIS — Z00.00 MEDICARE ANNUAL WELLNESS VISIT, SUBSEQUENT: Primary | ICD-10-CM

## 2025-03-05 DIAGNOSIS — Z13.1 ENCOUNTER FOR SCREENING FOR DIABETES MELLITUS: ICD-10-CM

## 2025-03-05 PROCEDURE — 99213 OFFICE O/P EST LOW 20 MIN: CPT | Performed by: FAMILY MEDICINE

## 2025-03-05 PROCEDURE — 99214 OFFICE O/P EST MOD 30 MIN: CPT | Performed by: FAMILY MEDICINE

## 2025-03-05 SDOH — ECONOMIC STABILITY: FOOD INSECURITY: WITHIN THE PAST 12 MONTHS, THE FOOD YOU BOUGHT JUST DIDN'T LAST AND YOU DIDN'T HAVE MONEY TO GET MORE.: NEVER TRUE

## 2025-03-05 SDOH — ECONOMIC STABILITY: FOOD INSECURITY: WITHIN THE PAST 12 MONTHS, YOU WORRIED THAT YOUR FOOD WOULD RUN OUT BEFORE YOU GOT MONEY TO BUY MORE.: NEVER TRUE

## 2025-03-05 NOTE — PROGRESS NOTES
Identified pt with two pt identifiers(name and )    Chief Complaint   Patient presents with    Medicare AWV    Anxiety     Patient would like to discuss worsening anxiety         Health Maintenance Due   Topic    Hepatitis C screen     Shingles vaccine (1 of 2)    Pneumococcal 50+ years Vaccine (1 of 1 - PCV)    Flu vaccine (1)    COVID-19 Vaccine ( season)       Wt Readings from Last 3 Encounters:   25 61.4 kg (135 lb 6.4 oz)   25 60.8 kg (134 lb)   25 62.6 kg (138 lb)     Temp Readings from Last 3 Encounters:   25 98.1 °F (36.7 °C) (Temporal)   25 97.8 °F (36.6 °C) (Oral)   25 97.3 °F (36.3 °C) (Temporal)     BP Readings from Last 3 Encounters:   25 109/76   25 93/64   25 130/82     Pulse Readings from Last 3 Encounters:   25 81   25 68   25 76           Depression Screening:  :         3/2/2025     8:25 AM 2025     8:21 AM 2/15/2024     9:37 AM 2024     2:00 PM 8/10/2023     2:37 PM 2023     3:22 PM 2023     7:46 AM   PHQ-9 Questionaire   Little interest or pleasure in doing things 0 0 0 0 0 0 0   Feeling down, depressed, or hopeless 0 0 0 0 0 0 0   PHQ-9 Total Score 0 0 0 0 0 0 0        Fall Risk Assessment:  :         3/2/2025     8:25 AM 2025     8:21 AM 2024     2:00 PM 8/10/2023     2:38 PM   Fall Risk   Do you feel unsteady or are you worried about falling?  no no no no   2 or more falls in past year? no no no no   Fall with injury in past year? no no no no        Abuse Screening:  :          No data to display                 Coordination of Care Questionnaire:  :     \"Have you been to the ER, urgent care clinic since your last visit?  Hospitalized since your last visit?\"    NO    “Have you seen or consulted any other health care providers outside our system since your last visit?”    NO        Click Here for Release of Records Request      ADRIANA CAM MA   
conversation to generate a clinical note. The patient (or guardian, if applicable) and other individuals in attendance at the appointment consented to the use of AI, including the recording.      
      CareTeam (Including outside providers/suppliers regularly involved in providing care):   Patient Care Team:  Ernestina Rizvi MD as PCP - General  Ernestina Rizvi MD as PCP - Empaneled Provider     Recommendations for Preventive Services Due: see orders and patient instructions/AVS.  Recommended screening schedule for the next 5-10 years is provided to the patient in written form: see Patient Instructions/AVS.     Reviewed and updated this visit:  Tobacco  Allergies  Meds  Problems  Med Hx  Sexual Hx                  The patient (or guardian, if applicable) and other individuals in attendance with the patient were advised that Artificial Intelligence will be utilized during this visit to record and process the conversation to generate a clinical note. The patient (or guardian, if applicable) and other individuals in attendance at the appointment consented to the use of AI, including the recording.

## 2025-03-06 LAB
25(OH)D3 SERPL-MCNC: 40.8 NG/ML (ref 30–100)
CRP SERPL-MCNC: 0.5 MG/DL (ref 0–0.3)
EST. AVERAGE GLUCOSE BLD GHB EST-MCNC: 105 MG/DL
HBA1C MFR BLD: 5.3 % (ref 4–5.6)
TSH SERPL DL<=0.05 MIU/L-ACNC: 0.9 UIU/ML (ref 0.36–3.74)

## 2025-03-07 ENCOUNTER — HOSPITAL ENCOUNTER (OUTPATIENT)
Facility: HOSPITAL | Age: 70
Setting detail: INFUSION SERIES
Discharge: HOME | End: 2025-03-07
Payer: MEDICARE

## 2025-03-07 VITALS
OXYGEN SATURATION: 100 % | HEART RATE: 77 BPM | WEIGHT: 133.6 LBS | SYSTOLIC BLOOD PRESSURE: 128 MMHG | BODY MASS INDEX: 22.81 KG/M2 | DIASTOLIC BLOOD PRESSURE: 68 MMHG | TEMPERATURE: 97.8 F | HEIGHT: 64 IN | RESPIRATION RATE: 18 BRPM

## 2025-03-07 DIAGNOSIS — M81.0 OSTEOPOROSIS WITHOUT CURRENT PATHOLOGICAL FRACTURE, UNSPECIFIED OSTEOPOROSIS TYPE: Primary | ICD-10-CM

## 2025-03-07 LAB
ANION GAP BLD CALC-SCNC: 10.2 MMOL/L (ref 10–20)
CA-I BLD-MCNC: 1.33 MMOL/L (ref 1.15–1.33)
CHLORIDE BLD-SCNC: 104 MMOL/L (ref 98–107)
CO2 BLD-SCNC: 29.8 MMOL/L (ref 21–32)
CREAT BLD-MCNC: 0.78 MG/DL (ref 0.6–1.3)
GLUCOSE BLD-MCNC: 97 MG/DL (ref 74–99)
POTASSIUM BLD-SCNC: 4 MMOL/L (ref 3.5–5.1)
SERVICE CMNT-IMP: NORMAL
SODIUM BLD-SCNC: 144 MMOL/L (ref 136–145)

## 2025-03-07 PROCEDURE — 6360000002 HC RX W HCPCS: Performed by: INTERNAL MEDICINE

## 2025-03-07 PROCEDURE — 80047 BASIC METABLC PNL IONIZED CA: CPT

## 2025-03-07 PROCEDURE — 96372 THER/PROPH/DIAG INJ SC/IM: CPT

## 2025-03-07 RX ORDER — ALBUTEROL SULFATE 90 UG/1
4 INHALANT RESPIRATORY (INHALATION) PRN
OUTPATIENT
Start: 2025-03-30

## 2025-03-07 RX ORDER — FAMOTIDINE 10 MG/ML
20 INJECTION, SOLUTION INTRAVENOUS
OUTPATIENT
Start: 2025-03-30

## 2025-03-07 RX ORDER — ONDANSETRON 2 MG/ML
8 INJECTION INTRAMUSCULAR; INTRAVENOUS
OUTPATIENT
Start: 2025-03-30

## 2025-03-07 RX ORDER — EPINEPHRINE 1 MG/ML
0.3 INJECTION, SOLUTION INTRAMUSCULAR; SUBCUTANEOUS PRN
OUTPATIENT
Start: 2025-03-30

## 2025-03-07 RX ORDER — DIPHENHYDRAMINE HYDROCHLORIDE 50 MG/ML
50 INJECTION INTRAMUSCULAR; INTRAVENOUS
OUTPATIENT
Start: 2025-03-30

## 2025-03-07 RX ORDER — SODIUM CHLORIDE 9 MG/ML
INJECTION, SOLUTION INTRAVENOUS CONTINUOUS
OUTPATIENT
Start: 2025-03-30

## 2025-03-07 RX ORDER — ACETAMINOPHEN 325 MG/1
650 TABLET ORAL
OUTPATIENT
Start: 2025-03-30

## 2025-03-07 RX ORDER — HYDROCORTISONE SODIUM SUCCINATE 100 MG/2ML
100 INJECTION INTRAMUSCULAR; INTRAVENOUS
OUTPATIENT
Start: 2025-03-30

## 2025-03-07 RX ADMIN — ROMOSOZUMAB-AQQG 105 MG: 105 INJECTION, SOLUTION SUBCUTANEOUS at 11:30

## 2025-03-07 ASSESSMENT — PAIN SCALES - GENERAL: PAINLEVEL_OUTOF10: 0

## 2025-03-07 NOTE — RESULT ENCOUNTER NOTE
Jatinder Murray,     Your blood work is back showing a mild elevation in your CRP, which is commonly seen with gut issues, like celiacs. We are still waiting for your celiac panel to come back.     Sincerely,     Dr. Rizvi

## 2025-03-07 NOTE — PROGRESS NOTES
\[facility]\"" Progress Note    Date: March 7, 2025        1040: Pt arrived ambulatory to \[facility]\"" for Evenity in stable condition.  Assessment completed.  Labs drawn and sent for processing.    Labs reviewed. Criteria for treatment was met.    Patient Vitals for the past 12 hrs:   Temp Pulse Resp BP SpO2   03/07/25 1045 97.8 °F (36.6 °C) 77 18 128/68 100 %     Recent Results (from the past 12 hour(s))   POC CHEM 8    Collection Time: 03/07/25 10:45 AM   Result Value Ref Range    POC Ionized Calcium 1.33 1.15 - 1.33 mmol/L    POC Sodium 144 136 - 145 mmol/L    POC Potassium 4.0 3.5 - 5.1 mmol/L    POC Chloride 104 98 - 107 mmol/L    POC TCO2 29.8 21 - 32 mmol/L    Anion Gap, POC 10.2 10 - 20 mmol/L    POC Glucose 97 74 - 99 mg/dL    POC Creatinine 0.78 0.6 - 1.3 mg/dL    eGFR, POC 82 >60 ml/min/1.73m2    UA Comment Comment Not Indicated.           Medications Administered         romosozumab-aqqg (EVENITY) injection 105 mg Admin Date  03/07/2025 Action  Given Dose  105 mg Route  SubCUTAneous Documented By  Melissa Daniels, RN        romosozumab-aqqg (EVENITY) injection 105 mg Admin Date  03/07/2025 Action  Given Dose  105 mg Route  SubCUTAneous Documented By  Melissa Daniels, RN           Given to Left Arm and RUQ of abd    Ms. Corbett tolerated the injections, and had no complaints.      Ms. Corbett was discharged from [facility] in stable condition. Patient is aware of next scheduled \[facility]\"" appointment 4/7/25 at 1400.         Future Appointments   Date Time Provider Department Center   4/7/2025  2:00 PM SS FASTTRACK 1 [facility]   5/6/2025  3:00 PM SS FASTTRACK 2 [facility]   6/6/2025  3:00 PM SS FASTTRACK 1 [facility]   7/14/2025 10:30 AM Lakisha Quigley MD DIABSHERYLO BS AMB         MELISSA DANIELS RN, RN  March 7, 2025'

## 2025-03-08 LAB
ENDOMYSIUM IGA SER QL: NEGATIVE
IGA SERPL-MCNC: 238 MG/DL (ref 87–352)
TTG IGA SER-ACNC: <2 U/ML (ref 0–3)

## 2025-03-10 DIAGNOSIS — E78.2 MODERATE MIXED HYPERLIPIDEMIA NOT REQUIRING STATIN THERAPY: ICD-10-CM

## 2025-03-10 RX ORDER — OMEGA-3-ACID ETHYL ESTERS 1 G/1
2 CAPSULE, LIQUID FILLED ORAL 2 TIMES DAILY
Qty: 60 CAPSULE | Refills: 5 | Status: SHIPPED | OUTPATIENT
Start: 2025-03-10 | End: 2025-03-11 | Stop reason: SDUPTHER

## 2025-03-11 ENCOUNTER — TELEPHONE (OUTPATIENT)
Age: 70
End: 2025-03-11

## 2025-03-11 DIAGNOSIS — E78.2 MODERATE MIXED HYPERLIPIDEMIA NOT REQUIRING STATIN THERAPY: ICD-10-CM

## 2025-03-11 DIAGNOSIS — E78.2 MODERATE MIXED HYPERLIPIDEMIA NOT REQUIRING STATIN THERAPY: Primary | ICD-10-CM

## 2025-03-11 RX ORDER — OMEGA-3-ACID ETHYL ESTERS 1 G/1
2 CAPSULE, LIQUID FILLED ORAL 2 TIMES DAILY
Qty: 60 CAPSULE | Refills: 5 | Status: CANCELLED | OUTPATIENT
Start: 2025-03-11

## 2025-03-11 RX ORDER — OMEGA-3-ACID ETHYL ESTERS 1 G/1
2 CAPSULE, LIQUID FILLED ORAL 2 TIMES DAILY
Qty: 360 CAPSULE | Refills: 3 | Status: SHIPPED | OUTPATIENT
Start: 2025-03-11

## 2025-03-11 RX ORDER — OMEGA-3-ACID ETHYL ESTERS 1 G/1
2 CAPSULE, LIQUID FILLED ORAL 2 TIMES DAILY
Qty: 60 CAPSULE | Refills: 5 | OUTPATIENT
Start: 2025-03-11

## 2025-03-11 RX ORDER — OMEGA-3-ACID ETHYL ESTERS 1 G/1
2 CAPSULE, LIQUID FILLED ORAL 2 TIMES DAILY
Qty: 60 CAPSULE | Refills: 5 | Status: SHIPPED | OUTPATIENT
Start: 2025-03-11 | End: 2025-03-11

## 2025-03-19 NOTE — TELEPHONE ENCOUNTER
1. Moderate mixed hyperlipidemia not requiring statin therapy    - omega-3 acid ethyl esters (LOVAZA) 1 g capsule; Take 2 capsules by mouth 2 times daily  Dispense: 360 capsule; Refill: 3

## 2025-04-07 ENCOUNTER — HOSPITAL ENCOUNTER (OUTPATIENT)
Facility: HOSPITAL | Age: 70
Setting detail: INFUSION SERIES
Discharge: HOME | End: 2025-04-07
Payer: MEDICARE

## 2025-04-07 VITALS
HEART RATE: 80 BPM | OXYGEN SATURATION: 95 % | BODY MASS INDEX: 23.47 KG/M2 | HEIGHT: 64 IN | WEIGHT: 137.5 LBS | TEMPERATURE: 97.6 F | SYSTOLIC BLOOD PRESSURE: 99 MMHG | RESPIRATION RATE: 18 BRPM | DIASTOLIC BLOOD PRESSURE: 61 MMHG

## 2025-04-07 DIAGNOSIS — M81.0 OSTEOPOROSIS WITHOUT CURRENT PATHOLOGICAL FRACTURE, UNSPECIFIED OSTEOPOROSIS TYPE: Primary | ICD-10-CM

## 2025-04-07 LAB
ANION GAP BLD CALC-SCNC: 9.2 MMOL/L (ref 10–20)
CA-I BLD-MCNC: 1.27 MMOL/L (ref 1.15–1.33)
CHLORIDE BLD-SCNC: 109 MMOL/L (ref 98–107)
CO2 BLD-SCNC: 22.8 MMOL/L (ref 21–32)
CREAT BLD-MCNC: 0.76 MG/DL (ref 0.6–1.3)
GLUCOSE BLD-MCNC: 85 MG/DL (ref 74–99)
POTASSIUM BLD-SCNC: 3.7 MMOL/L (ref 3.5–5.1)
SERVICE CMNT-IMP: ABNORMAL
SODIUM BLD-SCNC: 141 MMOL/L (ref 136–145)

## 2025-04-07 PROCEDURE — 6360000002 HC RX W HCPCS: Performed by: INTERNAL MEDICINE

## 2025-04-07 PROCEDURE — 80047 BASIC METABLC PNL IONIZED CA: CPT

## 2025-04-07 PROCEDURE — 96372 THER/PROPH/DIAG INJ SC/IM: CPT

## 2025-04-07 RX ORDER — FAMOTIDINE 10 MG/ML
20 INJECTION, SOLUTION INTRAVENOUS
OUTPATIENT
Start: 2025-04-28

## 2025-04-07 RX ORDER — DIPHENHYDRAMINE HYDROCHLORIDE 50 MG/ML
50 INJECTION, SOLUTION INTRAMUSCULAR; INTRAVENOUS
OUTPATIENT
Start: 2025-04-28

## 2025-04-07 RX ORDER — ACETAMINOPHEN 325 MG/1
650 TABLET ORAL
OUTPATIENT
Start: 2025-04-28

## 2025-04-07 RX ORDER — ALBUTEROL SULFATE 90 UG/1
4 INHALANT RESPIRATORY (INHALATION) PRN
OUTPATIENT
Start: 2025-04-28

## 2025-04-07 RX ORDER — ONDANSETRON 2 MG/ML
8 INJECTION INTRAMUSCULAR; INTRAVENOUS
OUTPATIENT
Start: 2025-04-28

## 2025-04-07 RX ORDER — SODIUM CHLORIDE 9 MG/ML
INJECTION, SOLUTION INTRAVENOUS CONTINUOUS
OUTPATIENT
Start: 2025-04-28

## 2025-04-07 RX ORDER — HYDROCORTISONE SODIUM SUCCINATE 100 MG/2ML
100 INJECTION INTRAMUSCULAR; INTRAVENOUS
OUTPATIENT
Start: 2025-04-28

## 2025-04-07 RX ORDER — EPINEPHRINE 1 MG/ML
0.3 INJECTION, SOLUTION INTRAMUSCULAR; SUBCUTANEOUS PRN
OUTPATIENT
Start: 2025-04-28

## 2025-04-07 RX ADMIN — ROMOSOZUMAB-AQQG 105 MG: 105 INJECTION, SOLUTION SUBCUTANEOUS at 14:50

## 2025-04-07 ASSESSMENT — PAIN SCALES - GENERAL: PAINLEVEL_OUTOF10: 0

## 2025-04-07 NOTE — PROGRESS NOTES
[facility] Progress Note        Date: 2025    Name: Terri Corbett    MRN: [id number]         : 1955    Ms. Corbett Arrived ambulatory and in no distress for Evernity injection Regimen.  Assessment was completed, no acute issues at this time, no new complaints voiced.   Lab drawn peripherally from [facility] and sent for processing. Pt denied having recent invasive dental procedure. Criteria are met for today treatment.    BP low today, pt denied dizziness or lightheadedness.     Ms. Corbett's vitals were reviewed.  Patient Vitals for the past 12 hrs:   Temp Pulse Resp BP SpO2   25 1400 97.6 °F (36.4 °C) 80 18 99/61 95 %         Lab results were obtained and reviewed.  Recent Results (from the past 12 hours)   POC CHEM 8    Collection Time: 25  2:17 PM   Result Value Ref Range    POC Ionized Calcium 1.27 1.15 - 1.33 mmol/L    POC Sodium 141 136 - 145 mmol/L    POC Potassium 3.7 3.5 - 5.1 mmol/L    POC Chloride 109 (H) 98 - 107 mmol/L    POC TCO2 22.8 21 - 32 mmol/L    Anion Gap, POC 9.2 (L) 10 - 20 mmol/L    POC Glucose 85 74 - 99 mg/dL    POC Creatinine 0.76 0.6 - 1.3 mg/dL    eGFR, POC 85 >60 ml/min/1.73m2    UA Comment Comment Not Indicated.         Medications received:  Medications Administered         romosozumab-aqqg (EVENITY) injection 105 mg Admin Date  2025 Action  Given Dose  105 mg Route  SubCUTAneous Documented By  Dali Brown RN        romosozumab-aqqg (EVENITY) injection 105 mg Admin Date  2025 Action  Given Dose  105 mg Route  SubCUTAneous Documented By  Dali Brown RN           SQ injections given in Left arm and LLQ today.     Ms. Corbett tolerated treatment well and was discharged from [facility] in stable condition at 1500. She is to return on  May 6, 2025 at 1500 for her next appointment.    Dali Brown RN  2025    Future Appointments:  Future Appointments   Date Time Provider Department Center   2025  3:00 PM  FASTTRACK

## 2025-05-06 ENCOUNTER — HOSPITAL ENCOUNTER (OUTPATIENT)
Facility: HOSPITAL | Age: 70
Setting detail: INFUSION SERIES
Discharge: HOME | End: 2025-05-06
Payer: MEDICARE

## 2025-05-06 VITALS
HEART RATE: 70 BPM | TEMPERATURE: 97.7 F | DIASTOLIC BLOOD PRESSURE: 68 MMHG | OXYGEN SATURATION: 96 % | RESPIRATION RATE: 18 BRPM | SYSTOLIC BLOOD PRESSURE: 110 MMHG | BODY MASS INDEX: 23.06 KG/M2 | HEIGHT: 64 IN | WEIGHT: 135.1 LBS

## 2025-05-06 DIAGNOSIS — M81.0 OSTEOPOROSIS WITHOUT CURRENT PATHOLOGICAL FRACTURE, UNSPECIFIED OSTEOPOROSIS TYPE: Primary | ICD-10-CM

## 2025-05-06 PROCEDURE — 96372 THER/PROPH/DIAG INJ SC/IM: CPT

## 2025-05-06 PROCEDURE — 6360000002 HC RX W HCPCS: Performed by: INTERNAL MEDICINE

## 2025-05-06 PROCEDURE — 80047 BASIC METABLC PNL IONIZED CA: CPT

## 2025-05-06 RX ORDER — EPINEPHRINE 1 MG/ML
0.3 INJECTION, SOLUTION INTRAMUSCULAR; SUBCUTANEOUS PRN
OUTPATIENT
Start: 2025-06-01

## 2025-05-06 RX ORDER — HYDROCORTISONE SODIUM SUCCINATE 100 MG/2ML
100 INJECTION INTRAMUSCULAR; INTRAVENOUS
OUTPATIENT
Start: 2025-06-01

## 2025-05-06 RX ORDER — FAMOTIDINE 10 MG/ML
20 INJECTION, SOLUTION INTRAVENOUS
OUTPATIENT
Start: 2025-06-01

## 2025-05-06 RX ORDER — SODIUM CHLORIDE 9 MG/ML
INJECTION, SOLUTION INTRAVENOUS CONTINUOUS
OUTPATIENT
Start: 2025-06-01

## 2025-05-06 RX ORDER — DIPHENHYDRAMINE HYDROCHLORIDE 50 MG/ML
50 INJECTION, SOLUTION INTRAMUSCULAR; INTRAVENOUS
OUTPATIENT
Start: 2025-06-01

## 2025-05-06 RX ORDER — ALBUTEROL SULFATE 90 UG/1
4 INHALANT RESPIRATORY (INHALATION) PRN
OUTPATIENT
Start: 2025-06-01

## 2025-05-06 RX ORDER — ACETAMINOPHEN 325 MG/1
650 TABLET ORAL
OUTPATIENT
Start: 2025-06-01

## 2025-05-06 RX ORDER — ONDANSETRON 2 MG/ML
8 INJECTION INTRAMUSCULAR; INTRAVENOUS
OUTPATIENT
Start: 2025-06-01

## 2025-05-06 RX ADMIN — ROMOSOZUMAB-AQQG 105 MG: 105 INJECTION, SOLUTION SUBCUTANEOUS at 15:54

## 2025-05-06 RX ADMIN — ROMOSOZUMAB-AQQG 105 MG: 105 INJECTION, SOLUTION SUBCUTANEOUS at 15:59

## 2025-05-06 ASSESSMENT — PAIN SCALES - GENERAL: PAINLEVEL_OUTOF10: 0

## 2025-05-06 NOTE — PROGRESS NOTES
[facility] Progress Note        Date: 25    Name: Terri Corbett    MRN: [id number]         : 1955    MD: Lakisha Quigley MD       Ms. Corbett admitted to [facility] for Monthly Evenity Injections ambulatory in stable condition. Assessment completed, no acute issues at this time. No new concerns voiced. Labs drawn peripherally and sent for processing. Labs were obtained and reviewed. Ca = 1.23 and Scr = 0.68, criteria met for treatment today.      ** Patient has received this medication in the past. Patient reports no previous reactions to the medication(s).        Vitals:    25 1503   BP: 110/68   Pulse: 70   Resp: 18   Temp: 97.7 °F (36.5 °C)   TempSrc: Temporal   SpO2: 96%   Weight: 61.3 kg (135 lb 1.6 oz)   Height: 1.626 m (5' 4\")         Medications:  MEDICATIONS GIVEN:  Medications Administered         romosozumab-aqqg (EVENITY) injection 105 mg Admin Date  2025 Action  Given Dose  105 mg Route  SubCUTAneous Documented By  Kyle Ryan, OLU        romosozumab-aqqg (EVENITY) injection 105 mg Admin Date  2025 Action  Given Dose  105 mg Route  SubCUTAneous Documented By  Kyle Ryan, OLU          Medication allowed to sit at room temperature for 30 minutes prior to administration.  Administered subq in GRUPO and LLQ.          Pt tolerated treatment well, no adverse reactions noted. D/c home ambulatory in no distress. Patient is aware of next appointment on 2025 @ 1500 at the [place] location.        Future Appointments:  Future Appointments   Date Time Provider Department Center   2025  3:00 PM SS FASTTRACK 1 [facility]   [facility] 11[facility]   2025 10:30 AM Lakisha Quigley MD DIABENDO BS AMB

## 2025-05-07 LAB
ANION GAP BLD CALC-SCNC: 10.5 MMOL/L (ref 10–20)
CA-I BLD-MCNC: 1.23 MMOL/L (ref 1.15–1.33)
CHLORIDE BLD-SCNC: 104 MMOL/L (ref 98–107)
CO2 BLD-SCNC: 23.5 MMOL/L (ref 21–32)
CREAT BLD-MCNC: 0.68 MG/DL (ref 0.6–1.3)
GLUCOSE BLD-MCNC: 102 MG/DL (ref 74–99)
POTASSIUM BLD-SCNC: 3.8 MMOL/L (ref 3.5–5.1)
SERVICE CMNT-IMP: ABNORMAL
SODIUM BLD-SCNC: 138 MMOL/L (ref 136–145)

## 2025-06-06 ENCOUNTER — HOSPITAL ENCOUNTER (OUTPATIENT)
Facility: HOSPITAL | Age: 70
Setting detail: INFUSION SERIES
Discharge: HOME | End: 2025-06-06
Payer: MEDICARE

## 2025-06-06 ENCOUNTER — HOSPITAL ENCOUNTER (OUTPATIENT)
Facility: HOSPITAL | Age: 70
Setting detail: INFUSION SERIES
End: 2025-06-06
Payer: MEDICARE

## 2025-06-06 VITALS
OXYGEN SATURATION: 95 % | RESPIRATION RATE: 16 BRPM | SYSTOLIC BLOOD PRESSURE: 113 MMHG | WEIGHT: 135.5 LBS | TEMPERATURE: 98.5 F | HEART RATE: 76 BPM | HEIGHT: 64 IN | DIASTOLIC BLOOD PRESSURE: 75 MMHG | BODY MASS INDEX: 23.13 KG/M2

## 2025-06-06 DIAGNOSIS — M81.0 OSTEOPOROSIS WITHOUT CURRENT PATHOLOGICAL FRACTURE, UNSPECIFIED OSTEOPOROSIS TYPE: Primary | ICD-10-CM

## 2025-06-06 LAB
ANION GAP BLD CALC-SCNC: 10.7 MMOL/L (ref 10–20)
CA-I BLD-MCNC: 1.26 MMOL/L (ref 1.15–1.33)
CHLORIDE BLD-SCNC: 104 MMOL/L (ref 98–107)
CO2 BLD-SCNC: 24.3 MMOL/L (ref 21–32)
CREAT BLD-MCNC: 0.73 MG/DL (ref 0.6–1.3)
GLUCOSE BLD-MCNC: 80 MG/DL (ref 74–99)
POTASSIUM BLD-SCNC: 3.9 MMOL/L (ref 3.5–5.1)
SERVICE CMNT-IMP: NORMAL
SODIUM BLD-SCNC: 139 MMOL/L (ref 136–145)

## 2025-06-06 PROCEDURE — 96372 THER/PROPH/DIAG INJ SC/IM: CPT

## 2025-06-06 PROCEDURE — 6360000002 HC RX W HCPCS: Performed by: INTERNAL MEDICINE

## 2025-06-06 PROCEDURE — 80047 BASIC METABLC PNL IONIZED CA: CPT

## 2025-06-06 RX ORDER — EPINEPHRINE 1 MG/ML
0.3 INJECTION, SOLUTION INTRAMUSCULAR; SUBCUTANEOUS PRN
OUTPATIENT
Start: 2025-07-04

## 2025-06-06 RX ORDER — SODIUM CHLORIDE 9 MG/ML
INJECTION, SOLUTION INTRAVENOUS CONTINUOUS
OUTPATIENT
Start: 2025-07-04

## 2025-06-06 RX ORDER — DIPHENHYDRAMINE HYDROCHLORIDE 50 MG/ML
50 INJECTION, SOLUTION INTRAMUSCULAR; INTRAVENOUS
OUTPATIENT
Start: 2025-07-04

## 2025-06-06 RX ORDER — HYDROCORTISONE SODIUM SUCCINATE 100 MG/2ML
100 INJECTION INTRAMUSCULAR; INTRAVENOUS
OUTPATIENT
Start: 2025-07-04

## 2025-06-06 RX ORDER — ACETAMINOPHEN 325 MG/1
650 TABLET ORAL
OUTPATIENT
Start: 2025-07-04

## 2025-06-06 RX ORDER — ONDANSETRON 2 MG/ML
8 INJECTION INTRAMUSCULAR; INTRAVENOUS
OUTPATIENT
Start: 2025-07-04

## 2025-06-06 RX ORDER — FAMOTIDINE 10 MG/ML
20 INJECTION, SOLUTION INTRAVENOUS
OUTPATIENT
Start: 2025-07-04

## 2025-06-06 RX ORDER — ALBUTEROL SULFATE 90 UG/1
4 INHALANT RESPIRATORY (INHALATION) PRN
OUTPATIENT
Start: 2025-07-04

## 2025-06-06 RX ADMIN — ROMOSOZUMAB-AQQG 105 MG: 105 INJECTION, SOLUTION SUBCUTANEOUS at 14:00

## 2025-06-06 RX ADMIN — ROMOSOZUMAB-AQQG 105 MG: 105 INJECTION, SOLUTION SUBCUTANEOUS at 14:01

## 2025-06-06 ASSESSMENT — PAIN SCALES - GENERAL: PAINLEVEL_OUTOF10: 0

## 2025-06-06 NOTE — PROGRESS NOTES
[facility] Progress Note    Date: 2025    Name: Terri Corbett    MRN: [id number]         : 1955    Ms. Corbett arrived ambulatory and in no distress for Evenity Injections.  Assessment was completed, no acute issues at this time, no new complaints voiced.  Patient denies recent dental work, jaw swelling, hip or thigh pain.  Lab drawn peripherally from left arm and processed.      Ms. Corbett's vitals were reviewed.  Vitals:    25 1317   BP: 113/75   Pulse: 76   Resp: 16   Temp: 98.5 °F (36.9 °C)   SpO2:        Recent Results (from the past 12 hours)   POC CHEM 8    Collection Time: 25  1:22 PM   Result Value Ref Range    POC Ionized Calcium 1.26 1.15 - 1.33 mmol/L    POC Sodium 139 136 - 145 mmol/L    POC Potassium 3.9 3.5 - 5.1 mmol/L    POC Chloride 104 98 - 107 mmol/L    POC TCO2 24.3 21 - 32 mmol/L    Anion Gap, POC 10.7 10 - 20 mmol/L    POC Glucose 80 74 - 99 mg/dL    POC Creatinine 0.73 0.6 - 1.3 mg/dL    eGFR, POC 89 >60 ml/min/1.73m2    UA Comment Comment Not Indicated.     ;    Medications:  Medications Administered         romosozumab-aqqg (EVENITY) injection 105 mg Admin Date  2025 Action  Given Dose  105 mg Route  SubCUTAneous Documented By  Francine Almazan RN        romosozumab-aqqg (EVENITY) injection 105 mg Admin Date  2025 Action  Given Dose  105 mg Route  SubCUTAneous Documented By  Francine Almazan RN             Injections were given in left and right arms    Ms. Corbett tolerated treatment well and was discharged from [facility] in stable condition.   She is aware of the next scheduled appointment.    AVS declined    FRANCINE ALMAZAN RN  2025    Future Appointments   Date Time Provider Department Center   2025 11:30 AM SS CHEMO CHAIR 23 [facility] INF [facility]   2025 10:30 AM Lakisha Quigley MD DIABSHERYLO BS AMB

## 2025-07-07 ENCOUNTER — HOSPITAL ENCOUNTER (OUTPATIENT)
Facility: HOSPITAL | Age: 70
Setting detail: INFUSION SERIES
Discharge: HOME | End: 2025-07-07
Payer: MEDICARE

## 2025-07-07 VITALS
TEMPERATURE: 98.7 F | HEIGHT: 64 IN | OXYGEN SATURATION: 97 % | SYSTOLIC BLOOD PRESSURE: 112 MMHG | DIASTOLIC BLOOD PRESSURE: 68 MMHG | RESPIRATION RATE: 18 BRPM | HEART RATE: 67 BPM | BODY MASS INDEX: 23.25 KG/M2 | WEIGHT: 136.2 LBS

## 2025-07-07 DIAGNOSIS — E55.9 VITAMIN D DEFICIENCY: ICD-10-CM

## 2025-07-07 DIAGNOSIS — M81.0 OSTEOPOROSIS WITHOUT CURRENT PATHOLOGICAL FRACTURE, UNSPECIFIED OSTEOPOROSIS TYPE: Primary | ICD-10-CM

## 2025-07-07 DIAGNOSIS — M80.00XA OSTEOPOROSIS WITH PATHOLOGICAL FRACTURE, INITIAL ENCOUNTER: ICD-10-CM

## 2025-07-07 LAB
25(OH)D3 SERPL-MCNC: 59.1 NG/ML (ref 30–100)
ANION GAP BLD CALC-SCNC: 8.1 MMOL/L (ref 10–20)
ANION GAP SERPL CALC-SCNC: 5 MMOL/L (ref 2–12)
BUN SERPL-MCNC: 26 MG/DL (ref 6–20)
BUN/CREAT SERPL: 31 (ref 12–20)
CA-I BLD-MCNC: 1.24 MMOL/L (ref 1.15–1.33)
CALCIUM SERPL-MCNC: 9.6 MG/DL (ref 8.5–10.1)
CHLORIDE BLD-SCNC: 104 MMOL/L (ref 98–107)
CHLORIDE SERPL-SCNC: 107 MMOL/L (ref 97–108)
CO2 BLD-SCNC: 25.9 MMOL/L (ref 21–32)
CO2 SERPL-SCNC: 27 MMOL/L (ref 21–32)
CREAT BLD-MCNC: 0.87 MG/DL (ref 0.6–1.3)
CREAT SERPL-MCNC: 0.83 MG/DL (ref 0.55–1.02)
GLUCOSE BLD-MCNC: 79 MG/DL (ref 74–99)
GLUCOSE SERPL-MCNC: 87 MG/DL (ref 65–100)
POTASSIUM BLD-SCNC: 3.7 MMOL/L (ref 3.5–5.1)
POTASSIUM SERPL-SCNC: 3.9 MMOL/L (ref 3.5–5.1)
SERVICE CMNT-IMP: ABNORMAL
SODIUM BLD-SCNC: 138 MMOL/L (ref 136–145)
SODIUM SERPL-SCNC: 139 MMOL/L (ref 136–145)

## 2025-07-07 PROCEDURE — 80048 BASIC METABOLIC PNL TOTAL CA: CPT

## 2025-07-07 PROCEDURE — 82306 VITAMIN D 25 HYDROXY: CPT

## 2025-07-07 PROCEDURE — 96372 THER/PROPH/DIAG INJ SC/IM: CPT

## 2025-07-07 PROCEDURE — 36415 COLL VENOUS BLD VENIPUNCTURE: CPT

## 2025-07-07 PROCEDURE — 6360000002 HC RX W HCPCS: Performed by: INTERNAL MEDICINE

## 2025-07-07 PROCEDURE — 80047 BASIC METABLC PNL IONIZED CA: CPT

## 2025-07-07 RX ORDER — EPINEPHRINE 1 MG/ML
0.3 INJECTION, SOLUTION INTRAMUSCULAR; SUBCUTANEOUS PRN
Status: CANCELLED | OUTPATIENT
Start: 2025-07-28

## 2025-07-07 RX ORDER — DIPHENHYDRAMINE HYDROCHLORIDE 50 MG/ML
50 INJECTION, SOLUTION INTRAMUSCULAR; INTRAVENOUS
Status: CANCELLED | OUTPATIENT
Start: 2025-07-28

## 2025-07-07 RX ORDER — HYDROCORTISONE SODIUM SUCCINATE 100 MG/2ML
100 INJECTION INTRAMUSCULAR; INTRAVENOUS
Status: CANCELLED | OUTPATIENT
Start: 2025-07-28

## 2025-07-07 RX ORDER — FAMOTIDINE 10 MG/ML
20 INJECTION, SOLUTION INTRAVENOUS
Status: CANCELLED | OUTPATIENT
Start: 2025-07-28

## 2025-07-07 RX ORDER — SODIUM CHLORIDE 9 MG/ML
INJECTION, SOLUTION INTRAVENOUS CONTINUOUS
Status: CANCELLED | OUTPATIENT
Start: 2025-07-28

## 2025-07-07 RX ORDER — ALBUTEROL SULFATE 90 UG/1
4 INHALANT RESPIRATORY (INHALATION) PRN
Status: CANCELLED | OUTPATIENT
Start: 2025-07-28

## 2025-07-07 RX ORDER — ACETAMINOPHEN 325 MG/1
650 TABLET ORAL
Status: CANCELLED | OUTPATIENT
Start: 2025-07-28

## 2025-07-07 RX ORDER — ONDANSETRON 2 MG/ML
8 INJECTION INTRAMUSCULAR; INTRAVENOUS
Status: CANCELLED | OUTPATIENT
Start: 2025-07-28

## 2025-07-07 RX ADMIN — ROMOSOZUMAB-AQQG 105 MG: 105 INJECTION, SOLUTION SUBCUTANEOUS at 12:22

## 2025-07-07 ASSESSMENT — PAIN SCALES - GENERAL: PAINLEVEL_OUTOF10: 0

## 2025-07-07 NOTE — PROGRESS NOTES
[facility] Progress Note    Date: July 7, 2025        1130: Pt arrived ambulatory to [facility] for Evenity Injections in stable condition.  Assessment completed.  Labs drawn and sent for processing.    Labs reviewed. Criteria for treatment was met.    Patient Vitals for the past 12 hrs:   Temp Pulse Resp BP SpO2   07/07/25 1130 98.7 °F (37.1 °C) 67 18 112/68 97 %     Recent Results (from the past 12 hours)   Basic Metabolic Panel    Collection Time: 07/07/25 11:37 AM   Result Value Ref Range    Sodium 139 136 - 145 mmol/L    Potassium 3.9 3.5 - 5.1 mmol/L    Chloride 107 97 - 108 mmol/L    CO2 27 21 - 32 mmol/L    Anion Gap 5 2 - 12 mmol/L    Glucose 87 65 - 100 mg/dL    BUN 26 (H) 6 - 20 MG/DL    Creatinine 0.83 0.55 - 1.02 MG/DL    BUN/Creatinine Ratio 31 (H) 12 - 20      Est, Glom Filt Rate 76 >60 ml/min/1.73m2    Calcium 9.6 8.5 - 10.1 MG/DL   POC CHEM 8    Collection Time: 07/07/25 11:40 AM   Result Value Ref Range    POC Ionized Calcium 1.24 1.15 - 1.33 mmol/L    POC Sodium 138 136 - 145 mmol/L    POC Potassium 3.7 3.5 - 5.1 mmol/L    POC Chloride 104 98 - 107 mmol/L    POC TCO2 25.9 21 - 32 mmol/L    Anion Gap, POC 8.1 (L) 10 - 20 mmol/L    POC Glucose 79 74 - 99 mg/dL    POC Creatinine 0.87 0.6 - 1.3 mg/dL    eGFR, POC 72 >60 ml/min/1.73m2    UA Comment Comment Not Indicated.           Medications Administered         romosozumab-aqqg (EVENITY) injection 105 mg Admin Date  07/07/2025 Action  Given Dose  105 mg Route  SubCUTAneous Documented By  Jossy Real, RN        romosozumab-aqqg (EVENITY) injection 105 mg Admin Date  07/07/2025 Action  Given Dose  105 mg Route  SubCUTAneous Documented By  Jossy Real, RN           Given to left and right arm    Ms. Corbett tolerated the infusion, and had no complaints.      Ms. Corbett was discharged from [facility] in stable condition. Patient is aware there are currently no more appointments scheduled at this time.        Future Appointments   Date Time

## 2025-07-14 ENCOUNTER — OFFICE VISIT (OUTPATIENT)
Age: 70
End: 2025-07-14
Payer: MEDICARE

## 2025-07-14 VITALS
HEART RATE: 68 BPM | OXYGEN SATURATION: 98 % | RESPIRATION RATE: 18 BRPM | HEIGHT: 64 IN | DIASTOLIC BLOOD PRESSURE: 73 MMHG | TEMPERATURE: 98.2 F | BODY MASS INDEX: 23.18 KG/M2 | SYSTOLIC BLOOD PRESSURE: 121 MMHG | WEIGHT: 135.8 LBS

## 2025-07-14 DIAGNOSIS — M81.0 AGE RELATED OSTEOPOROSIS, UNSPECIFIED PATHOLOGICAL FRACTURE PRESENCE: ICD-10-CM

## 2025-07-14 DIAGNOSIS — M80.80XS LOCALIZED OSTEOPOROSIS WITH CURRENT PATHOLOGICAL FRACTURE, SEQUELA: Primary | ICD-10-CM

## 2025-07-14 DIAGNOSIS — E55.9 VITAMIN D DEFICIENCY: ICD-10-CM

## 2025-07-14 PROCEDURE — G8420 CALC BMI NORM PARAMETERS: HCPCS | Performed by: INTERNAL MEDICINE

## 2025-07-14 PROCEDURE — G8399 PT W/DXA RESULTS DOCUMENT: HCPCS | Performed by: INTERNAL MEDICINE

## 2025-07-14 PROCEDURE — 99214 OFFICE O/P EST MOD 30 MIN: CPT | Performed by: INTERNAL MEDICINE

## 2025-07-14 PROCEDURE — 1090F PRES/ABSN URINE INCON ASSESS: CPT | Performed by: INTERNAL MEDICINE

## 2025-07-14 PROCEDURE — G2211 COMPLEX E/M VISIT ADD ON: HCPCS | Performed by: INTERNAL MEDICINE

## 2025-07-14 PROCEDURE — 3017F COLORECTAL CA SCREEN DOC REV: CPT | Performed by: INTERNAL MEDICINE

## 2025-07-14 PROCEDURE — 1126F AMNT PAIN NOTED NONE PRSNT: CPT | Performed by: INTERNAL MEDICINE

## 2025-07-14 PROCEDURE — G8427 DOCREV CUR MEDS BY ELIG CLIN: HCPCS | Performed by: INTERNAL MEDICINE

## 2025-07-14 PROCEDURE — 1123F ACP DISCUSS/DSCN MKR DOCD: CPT | Performed by: INTERNAL MEDICINE

## 2025-07-14 PROCEDURE — 1036F TOBACCO NON-USER: CPT | Performed by: INTERNAL MEDICINE

## 2025-07-14 PROCEDURE — 1160F RVW MEDS BY RX/DR IN RCRD: CPT | Performed by: INTERNAL MEDICINE

## 2025-07-14 PROCEDURE — 1159F MED LIST DOCD IN RCRD: CPT | Performed by: INTERNAL MEDICINE

## 2025-07-14 NOTE — PATIENT INSTRUCTIONS
Benefits of osteoporosis medications  Medicines for osteoporosis help prevent bone loss and increase bone density, which decrease the fracturesi.The table below outlines the common osteoporosis drugs, and how much they reduce the risk of fractures.     Drug name How you take the medication Relative risk reduction for vertebral fracture  Relative risk reduction for hip fractures                           Zolendronate (Reclast) Yearly IV infusion 70% 41%     Denosumabviiiviii  (Prolia) Twice yearly injection under the skin 68% 40%                       Safety and side effects of osteoporosis medications    Common side effects of osteoporosis drugs  Flu-like symptoms: This is uncommon in oral osteoporosis drugs, but about 1 in 20 patients receiving the injectable osteoporosis drugs (reclast and prolia) can have flu-like symptoms for 1-3 days after the injection.   Low calcium (“hypocalcemia”): This is uncommon in oral osteoporosis drugs, but about 1-2 out of 100 patients receiving injectable osteoporosis drugs (reclast and prolia) can cause low calcium levels in the blood.     Rare side effects of osteoporosis drugs  Osteonecrosis of the jaw (ONJ):   0.01% over 10 years.In other words, one out of every 10,000 people to take this medication for 10 years could get ONJ.  ONJ is a rare condition where the bone of the lower or upper jaw becomes exposed (usually because of tooth extractions) and does not heal properly. This is a very rare side effect, and the risk is thought to be primarily for people on high doses of medication in the setting of cancer.     Atypical femoral fractures (AFF):   0.5% over 10 years.  In other words 5 out of every 1000 people to take this medication for 10 years could have an atypical femoral fracture.   An atypical femur fracture is called “atypical” because of the location and condition of the fracture. AFFs start as a weakening of the outer rim of the femur below the hip area. The tiny

## 2025-07-14 NOTE — PROGRESS NOTES
Terri Corbett is a 69 y.o. female here for   Chief Complaint   Patient presents with    Osteoporosis       1. Have you been to the ER, urgent care clinic since your last visit?  Hospitalized since your last visit? -NO    2. Have you seen or consulted any other health care providers outside of the [facility] since your last visit?  Include any pap smears or colon screening.-Orthopedic Dr.Hadfield shoulder, Dermatologist - [place]

## 2025-07-14 NOTE — PROGRESS NOTES
[facility] DIABETES AND ENDOCRINOLOGY               Lakisha Quigley MD             Patient Information  Date:7/14/2025  Name : Terri Corbett 69 y.o.     YOB: 1955         Referred by: Ernestina Rizvi MD       Chief Complaint   Patient presents with    Osteoporosis       History of Present Illness: Terri Corbett is a 69 y.o. female here for f/u of osteoporosis.    she was found to have osteoporosis based on the BMD in 2024, lumbar spine is the weakest, recent T6, T8 compression fracture, low trauma, s/p kyphoplasty, biopsy no malignancy  Celiac disease diagnosed at age 50, on gluten-free diet  No  premature menopause  +  loss of height      History of Present Illness    Osteoporosis Management  - She has completed the full course of Evenity without experiencing adverse effects, including chest pain, although she does recall localized soreness at a previous injection site.  - No dental procedures have been performed.    Skin Patches  - She intermittently applies desonide ointment to facial skin patches,     Physical Activities  - The patient engages in various physical activities, including swimming, cycling, and has recently initiated weightlifting exercises.  - Her regimen includes step-ups with 10-pound weights, squats with weights, lunges with weights, leg presses at 80 pounds, and deadlifts at 28 pounds.    Vitamin D Supplementation  - She has been supplementing with vitamin D since her diagnosis of celiac disease,       Rotator cuff repair surgery.        Wt Readings from Last 3 Encounters:   07/14/25 61.6 kg (135 lb 12.8 oz)   07/07/25 61.8 kg (136 lb 3.2 oz)   06/06/25 61.5 kg (135 lb 8 oz)        Past Medical History:   Diagnosis Date    Celiac disease 10/2005    Compression fracture of thoracic spine, non-traumatic, initial encounter ([facility])     Hypercholesterolemia        Current Outpatient Medications   Medication Sig    omega-3 acid ethyl esters (LOVAZA) 1 g capsule Take 2 capsules by mouth 2

## 2025-07-16 RX ORDER — SODIUM CHLORIDE 0.9 % (FLUSH) 0.9 %
5-40 SYRINGE (ML) INJECTION PRN
OUTPATIENT
Start: 2025-07-23

## 2025-07-16 RX ORDER — HYDROCORTISONE SODIUM SUCCINATE 100 MG/2ML
100 INJECTION INTRAMUSCULAR; INTRAVENOUS
OUTPATIENT
Start: 2025-07-23

## 2025-07-16 RX ORDER — ALBUTEROL SULFATE 90 UG/1
4 INHALANT RESPIRATORY (INHALATION) PRN
OUTPATIENT
Start: 2025-07-23

## 2025-07-16 RX ORDER — EPINEPHRINE 1 MG/ML
0.3 INJECTION, SOLUTION INTRAMUSCULAR; SUBCUTANEOUS PRN
OUTPATIENT
Start: 2025-07-23

## 2025-07-16 RX ORDER — ACETAMINOPHEN 325 MG/1
650 TABLET ORAL
OUTPATIENT
Start: 2025-07-23

## 2025-07-16 RX ORDER — ZOLEDRONIC ACID 0.05 MG/ML
5 INJECTION, SOLUTION INTRAVENOUS ONCE
OUTPATIENT
Start: 2025-07-23 | End: 2025-07-23

## 2025-07-16 RX ORDER — SODIUM CHLORIDE 9 MG/ML
5-250 INJECTION, SOLUTION INTRAVENOUS PRN
OUTPATIENT
Start: 2025-07-23

## 2025-07-16 RX ORDER — SODIUM CHLORIDE 9 MG/ML
INJECTION, SOLUTION INTRAVENOUS PRN
OUTPATIENT
Start: 2025-07-23

## 2025-07-16 RX ORDER — DIPHENHYDRAMINE HYDROCHLORIDE 50 MG/ML
50 INJECTION, SOLUTION INTRAMUSCULAR; INTRAVENOUS
OUTPATIENT
Start: 2025-07-23

## 2025-07-16 RX ORDER — HEPARIN 100 UNIT/ML
500 SYRINGE INTRAVENOUS PRN
OUTPATIENT
Start: 2025-07-23

## 2025-07-16 RX ORDER — FAMOTIDINE 10 MG/ML
20 INJECTION, SOLUTION INTRAVENOUS
OUTPATIENT
Start: 2025-07-23

## 2025-07-16 RX ORDER — ONDANSETRON 2 MG/ML
8 INJECTION INTRAMUSCULAR; INTRAVENOUS
OUTPATIENT
Start: 2025-07-23

## 2025-07-23 ENCOUNTER — HOSPITAL ENCOUNTER (OUTPATIENT)
Facility: HOSPITAL | Age: 70
Setting detail: INFUSION SERIES
End: 2025-07-23

## 2025-07-23 DIAGNOSIS — M81.0 OSTEOPOROSIS WITHOUT CURRENT PATHOLOGICAL FRACTURE, UNSPECIFIED OSTEOPOROSIS TYPE: Primary | ICD-10-CM

## 2025-07-28 ENCOUNTER — PATIENT MESSAGE (OUTPATIENT)
Age: 70
End: 2025-07-28

## 2025-07-28 DIAGNOSIS — Z12.11 SCREENING FOR COLON CANCER: Primary | ICD-10-CM

## 2025-07-31 ENCOUNTER — TELEPHONE (OUTPATIENT)
Age: 70
End: 2025-07-31

## 2025-08-01 ENCOUNTER — HOSPITAL ENCOUNTER (OUTPATIENT)
Facility: HOSPITAL | Age: 70
Discharge: HOME | End: 2025-08-01
Attending: INTERNAL MEDICINE
Payer: MEDICARE

## 2025-08-01 ENCOUNTER — TELEPHONE (OUTPATIENT)
Age: 70
End: 2025-08-01

## 2025-08-01 VITALS — BODY MASS INDEX: 23.57 KG/M2 | HEIGHT: 63 IN | WEIGHT: 133 LBS

## 2025-08-01 DIAGNOSIS — M80.80XS LOCALIZED OSTEOPOROSIS WITH CURRENT PATHOLOGICAL FRACTURE, SEQUELA: ICD-10-CM

## 2025-08-01 DIAGNOSIS — M81.0 AGE RELATED OSTEOPOROSIS, UNSPECIFIED PATHOLOGICAL FRACTURE PRESENCE: ICD-10-CM

## 2025-08-01 DIAGNOSIS — E55.9 VITAMIN D DEFICIENCY: ICD-10-CM

## 2025-08-01 PROCEDURE — 77080 DXA BONE DENSITY AXIAL: CPT

## 2025-08-01 NOTE — TELEPHONE ENCOUNTER
Spoke with patient. She had a colonoscopy 3 years ago. She has family history of colon cancer. She will need to be seen prior to scheduling colonoscopy. Message sent to Dr. Arshad office to schedule office visit.

## 2025-08-04 ENCOUNTER — OFFICE VISIT (OUTPATIENT)
Age: 70
End: 2025-08-04

## 2025-08-04 VITALS
DIASTOLIC BLOOD PRESSURE: 83 MMHG | BODY MASS INDEX: 23.92 KG/M2 | WEIGHT: 135 LBS | SYSTOLIC BLOOD PRESSURE: 134 MMHG | HEIGHT: 63 IN | OXYGEN SATURATION: 95 % | RESPIRATION RATE: 16 BRPM | TEMPERATURE: 97.5 F | HEART RATE: 70 BPM

## 2025-08-04 DIAGNOSIS — A69.20 ERYTHEMA MIGRANS (LYME DISEASE): Primary | ICD-10-CM

## 2025-08-04 RX ORDER — DOXYCYCLINE HYCLATE 100 MG
100 TABLET ORAL 2 TIMES DAILY
Qty: 20 TABLET | Refills: 0 | Status: SHIPPED | OUTPATIENT
Start: 2025-08-04 | End: 2025-08-14

## 2025-08-06 ENCOUNTER — HOSPITAL ENCOUNTER (OUTPATIENT)
Facility: HOSPITAL | Age: 70
Setting detail: INFUSION SERIES
Discharge: HOME | End: 2025-08-06
Payer: MEDICARE

## 2025-08-06 VITALS
DIASTOLIC BLOOD PRESSURE: 75 MMHG | SYSTOLIC BLOOD PRESSURE: 111 MMHG | OXYGEN SATURATION: 96 % | RESPIRATION RATE: 18 BRPM | TEMPERATURE: 97.3 F | HEART RATE: 91 BPM

## 2025-08-06 DIAGNOSIS — M81.0 OSTEOPOROSIS WITHOUT CURRENT PATHOLOGICAL FRACTURE, UNSPECIFIED OSTEOPOROSIS TYPE: Primary | ICD-10-CM

## 2025-08-06 LAB
ANION GAP BLD CALC-SCNC: 10 MMOL/L (ref 10–20)
CA-I BLD-MCNC: 1.28 MMOL/L (ref 1.15–1.33)
CHLORIDE BLD-SCNC: 105 MMOL/L (ref 98–107)
CO2 BLD-SCNC: 30 MMOL/L (ref 21–32)
CREAT BLD-MCNC: 0.77 MG/DL (ref 0.6–1.3)
GLUCOSE BLD-MCNC: 84 MG/DL (ref 74–99)
POTASSIUM BLD-SCNC: 3.9 MMOL/L (ref 3.5–5.1)
SERVICE CMNT-IMP: NORMAL
SODIUM BLD-SCNC: 145 MMOL/L (ref 136–145)

## 2025-08-06 PROCEDURE — 80047 BASIC METABLC PNL IONIZED CA: CPT

## 2025-08-06 PROCEDURE — 96374 THER/PROPH/DIAG INJ IV PUSH: CPT

## 2025-08-06 PROCEDURE — 6360000002 HC RX W HCPCS: Performed by: INTERNAL MEDICINE

## 2025-08-06 RX ORDER — ZOLEDRONIC ACID 0.05 MG/ML
5 INJECTION, SOLUTION INTRAVENOUS ONCE
Status: COMPLETED | OUTPATIENT
Start: 2025-08-06 | End: 2025-08-06

## 2025-08-06 RX ORDER — SODIUM CHLORIDE 9 MG/ML
5-250 INJECTION, SOLUTION INTRAVENOUS PRN
Status: DISCONTINUED | OUTPATIENT
Start: 2025-08-06 | End: 2025-08-07 | Stop reason: HOSPADM

## 2025-08-06 RX ORDER — HYDROCORTISONE SODIUM SUCCINATE 100 MG/2ML
100 INJECTION INTRAMUSCULAR; INTRAVENOUS
OUTPATIENT
Start: 2026-08-05

## 2025-08-06 RX ORDER — EPINEPHRINE 1 MG/ML
0.3 INJECTION, SOLUTION INTRAMUSCULAR; SUBCUTANEOUS PRN
OUTPATIENT
Start: 2026-08-05

## 2025-08-06 RX ORDER — ACETAMINOPHEN 325 MG/1
650 TABLET ORAL
OUTPATIENT
Start: 2026-08-05

## 2025-08-06 RX ORDER — SODIUM CHLORIDE 9 MG/ML
5-250 INJECTION, SOLUTION INTRAVENOUS PRN
OUTPATIENT
Start: 2026-08-05

## 2025-08-06 RX ORDER — HEPARIN 100 UNIT/ML
500 SYRINGE INTRAVENOUS PRN
OUTPATIENT
Start: 2026-08-05

## 2025-08-06 RX ORDER — SODIUM CHLORIDE 9 MG/ML
INJECTION, SOLUTION INTRAVENOUS PRN
OUTPATIENT
Start: 2026-08-05

## 2025-08-06 RX ORDER — DIPHENHYDRAMINE HYDROCHLORIDE 50 MG/ML
50 INJECTION, SOLUTION INTRAMUSCULAR; INTRAVENOUS
OUTPATIENT
Start: 2026-08-05

## 2025-08-06 RX ORDER — ONDANSETRON 2 MG/ML
8 INJECTION INTRAMUSCULAR; INTRAVENOUS
OUTPATIENT
Start: 2026-08-05

## 2025-08-06 RX ORDER — SODIUM CHLORIDE 0.9 % (FLUSH) 0.9 %
5-40 SYRINGE (ML) INJECTION PRN
OUTPATIENT
Start: 2026-08-05

## 2025-08-06 RX ORDER — ZOLEDRONIC ACID 0.05 MG/ML
5 INJECTION, SOLUTION INTRAVENOUS ONCE
OUTPATIENT
Start: 2026-08-05 | End: 2026-08-05

## 2025-08-06 RX ORDER — ALBUTEROL SULFATE 90 UG/1
4 INHALANT RESPIRATORY (INHALATION) PRN
OUTPATIENT
Start: 2026-08-05

## 2025-08-06 RX ADMIN — ZOLEDRONIC ACID 5 MG: 5 INJECTION INTRAVENOUS at 15:24

## 2025-08-06 ASSESSMENT — PAIN SCALES - GENERAL: PAINLEVEL_OUTOF10: 0
